# Patient Record
Sex: MALE | Race: WHITE
[De-identification: names, ages, dates, MRNs, and addresses within clinical notes are randomized per-mention and may not be internally consistent; named-entity substitution may affect disease eponyms.]

---

## 2017-05-30 PROBLEM — Z00.00 ENCOUNTER FOR PREVENTIVE HEALTH EXAMINATION: Status: ACTIVE | Noted: 2017-05-30

## 2017-06-01 ENCOUNTER — RECORD ABSTRACTING (OUTPATIENT)
Age: 82
End: 2017-06-01

## 2017-06-06 ENCOUNTER — APPOINTMENT (OUTPATIENT)
Dept: CARDIOLOGY | Facility: CLINIC | Age: 82
End: 2017-06-06

## 2017-06-06 ENCOUNTER — RECORD ABSTRACTING (OUTPATIENT)
Age: 82
End: 2017-06-06

## 2017-06-06 VITALS — BODY MASS INDEX: 25.33 KG/M2 | HEIGHT: 65 IN | WEIGHT: 152 LBS

## 2017-06-06 VITALS — SYSTOLIC BLOOD PRESSURE: 144 MMHG | HEART RATE: 64 BPM | DIASTOLIC BLOOD PRESSURE: 80 MMHG

## 2017-06-06 VITALS — SYSTOLIC BLOOD PRESSURE: 160 MMHG | DIASTOLIC BLOOD PRESSURE: 84 MMHG

## 2017-06-06 DIAGNOSIS — Z86.39 PERSONAL HISTORY OF OTHER ENDOCRINE, NUTRITIONAL AND METABOLIC DISEASE: ICD-10-CM

## 2017-06-06 DIAGNOSIS — Z98.890 OTHER SPECIFIED POSTPROCEDURAL STATES: ICD-10-CM

## 2017-06-06 DIAGNOSIS — Z95.9 PRESENCE OF CARDIAC AND VASCULAR IMPLANT AND GRAFT, UNSPECIFIED: ICD-10-CM

## 2017-06-06 DIAGNOSIS — Z78.9 OTHER SPECIFIED HEALTH STATUS: ICD-10-CM

## 2017-06-06 DIAGNOSIS — Z87.891 PERSONAL HISTORY OF NICOTINE DEPENDENCE: ICD-10-CM

## 2017-06-06 DIAGNOSIS — K40.90 UNILATERAL INGUINAL HERNIA, W/OUT OBSTRUCTION OR GANGRENE, NOT SPECIFIED AS RECURRENT: ICD-10-CM

## 2017-06-06 DIAGNOSIS — K21.9 GASTRO-ESOPHAGEAL REFLUX DISEASE W/OUT ESOPHAGITIS: ICD-10-CM

## 2017-06-06 DIAGNOSIS — K21.0 GASTRO-ESOPHAGEAL REFLUX DISEASE WITH ESOPHAGITIS: ICD-10-CM

## 2017-06-06 RX ORDER — OMEPRAZOLE 40 MG/1
40 CAPSULE, DELAYED RELEASE ORAL
Refills: 0 | Status: ACTIVE | COMMUNITY

## 2017-06-06 RX ORDER — CLOPIDOGREL BISULFATE 75 MG/1
75 TABLET, FILM COATED ORAL DAILY
Refills: 0 | Status: ACTIVE | COMMUNITY

## 2017-06-06 RX ORDER — ASPIRIN 81 MG
81 TABLET, DELAYED RELEASE (ENTERIC COATED) ORAL DAILY
Refills: 0 | Status: ACTIVE | COMMUNITY

## 2017-06-06 RX ORDER — FOLIC ACID 1 MG/1
1 TABLET ORAL DAILY
Refills: 0 | Status: ACTIVE | COMMUNITY

## 2017-06-06 RX ORDER — METOPROLOL TARTRATE 25 MG/1
25 TABLET, FILM COATED ORAL
Refills: 0 | Status: ACTIVE | COMMUNITY

## 2017-06-06 RX ORDER — ATORVASTATIN CALCIUM 10 MG/1
10 TABLET, FILM COATED ORAL
Refills: 0 | Status: ACTIVE | COMMUNITY

## 2017-06-06 RX ORDER — CLONAZEPAM 1 MG/1
1 TABLET ORAL DAILY
Refills: 0 | Status: ACTIVE | COMMUNITY

## 2017-06-19 ENCOUNTER — OUTPATIENT (OUTPATIENT)
Dept: OUTPATIENT SERVICES | Facility: HOSPITAL | Age: 82
LOS: 1 days | Discharge: HOME | End: 2017-06-19

## 2017-06-19 DIAGNOSIS — R07.89 OTHER CHEST PAIN: ICD-10-CM

## 2017-06-28 DIAGNOSIS — I10 ESSENTIAL (PRIMARY) HYPERTENSION: ICD-10-CM

## 2017-06-28 DIAGNOSIS — I25.118 ATHEROSCLEROTIC HEART DISEASE OF NATIVE CORONARY ARTERY WITH OTHER FORMS OF ANGINA PECTORIS: ICD-10-CM

## 2017-06-28 DIAGNOSIS — Z87.891 PERSONAL HISTORY OF NICOTINE DEPENDENCE: ICD-10-CM

## 2017-06-28 DIAGNOSIS — Z98.61 CORONARY ANGIOPLASTY STATUS: ICD-10-CM

## 2017-06-28 DIAGNOSIS — J44.9 CHRONIC OBSTRUCTIVE PULMONARY DISEASE, UNSPECIFIED: ICD-10-CM

## 2017-06-28 DIAGNOSIS — R94.39 ABNORMAL RESULT OF OTHER CARDIOVASCULAR FUNCTION STUDY: ICD-10-CM

## 2017-06-30 ENCOUNTER — OUTPATIENT (OUTPATIENT)
Dept: OUTPATIENT SERVICES | Facility: HOSPITAL | Age: 82
LOS: 1 days | Discharge: HOME | End: 2017-06-30

## 2017-06-30 DIAGNOSIS — D50.8 OTHER IRON DEFICIENCY ANEMIAS: ICD-10-CM

## 2017-06-30 DIAGNOSIS — R07.89 OTHER CHEST PAIN: ICD-10-CM

## 2017-06-30 DIAGNOSIS — E11.9 TYPE 2 DIABETES MELLITUS WITHOUT COMPLICATIONS: ICD-10-CM

## 2017-06-30 DIAGNOSIS — E78.2 MIXED HYPERLIPIDEMIA: ICD-10-CM

## 2017-06-30 DIAGNOSIS — I50.9 HEART FAILURE, UNSPECIFIED: ICD-10-CM

## 2017-06-30 DIAGNOSIS — D53.9 NUTRITIONAL ANEMIA, UNSPECIFIED: ICD-10-CM

## 2017-07-11 ENCOUNTER — APPOINTMENT (OUTPATIENT)
Dept: CARDIOLOGY | Facility: CLINIC | Age: 82
End: 2017-07-11

## 2017-07-11 VITALS — HEART RATE: 67 BPM | SYSTOLIC BLOOD PRESSURE: 140 MMHG | DIASTOLIC BLOOD PRESSURE: 80 MMHG

## 2017-07-11 DIAGNOSIS — I34.0 NONRHEUMATIC MITRAL (VALVE) INSUFFICIENCY: ICD-10-CM

## 2017-07-11 DIAGNOSIS — I25.2 OLD MYOCARDIAL INFARCTION: ICD-10-CM

## 2017-07-11 DIAGNOSIS — I25.10 ATHEROSCLEROTIC HEART DISEASE OF NATIVE CORONARY ARTERY W/OUT ANGINA PECTORIS: ICD-10-CM

## 2017-07-11 DIAGNOSIS — J44.9 CHRONIC OBSTRUCTIVE PULMONARY DISEASE, UNSPECIFIED: ICD-10-CM

## 2017-07-11 DIAGNOSIS — I07.1 RHEUMATIC TRICUSPID INSUFFICIENCY: ICD-10-CM

## 2017-07-11 DIAGNOSIS — I35.1 NONRHEUMATIC AORTIC (VALVE) INSUFFICIENCY: ICD-10-CM

## 2017-07-11 DIAGNOSIS — E78.5 HYPERLIPIDEMIA, UNSPECIFIED: ICD-10-CM

## 2017-10-23 ENCOUNTER — OUTPATIENT (OUTPATIENT)
Dept: OUTPATIENT SERVICES | Facility: HOSPITAL | Age: 82
LOS: 1 days | Discharge: HOME | End: 2017-10-23

## 2017-10-23 DIAGNOSIS — N39.0 URINARY TRACT INFECTION, SITE NOT SPECIFIED: ICD-10-CM

## 2017-10-23 DIAGNOSIS — D50.8 OTHER IRON DEFICIENCY ANEMIAS: ICD-10-CM

## 2017-10-23 DIAGNOSIS — R07.89 OTHER CHEST PAIN: ICD-10-CM

## 2017-10-23 DIAGNOSIS — E11.9 TYPE 2 DIABETES MELLITUS WITHOUT COMPLICATIONS: ICD-10-CM

## 2017-10-23 DIAGNOSIS — R53.82 CHRONIC FATIGUE, UNSPECIFIED: ICD-10-CM

## 2017-10-23 DIAGNOSIS — E87.2 ACIDOSIS: ICD-10-CM

## 2017-10-23 DIAGNOSIS — E78.2 MIXED HYPERLIPIDEMIA: ICD-10-CM

## 2017-10-23 DIAGNOSIS — D53.9 NUTRITIONAL ANEMIA, UNSPECIFIED: ICD-10-CM

## 2017-11-01 DIAGNOSIS — R06.89 OTHER ABNORMALITIES OF BREATHING: ICD-10-CM

## 2017-12-20 ENCOUNTER — OUTPATIENT (OUTPATIENT)
Dept: OUTPATIENT SERVICES | Facility: HOSPITAL | Age: 82
LOS: 1 days | Discharge: HOME | End: 2017-12-20

## 2017-12-20 DIAGNOSIS — D53.9 NUTRITIONAL ANEMIA, UNSPECIFIED: ICD-10-CM

## 2017-12-20 DIAGNOSIS — R07.89 OTHER CHEST PAIN: ICD-10-CM

## 2018-02-27 ENCOUNTER — OUTPATIENT (OUTPATIENT)
Dept: OUTPATIENT SERVICES | Facility: HOSPITAL | Age: 83
LOS: 1 days | Discharge: HOME | End: 2018-02-27

## 2018-02-27 DIAGNOSIS — D53.9 NUTRITIONAL ANEMIA, UNSPECIFIED: ICD-10-CM

## 2018-05-23 ENCOUNTER — OUTPATIENT (OUTPATIENT)
Dept: OUTPATIENT SERVICES | Facility: HOSPITAL | Age: 83
LOS: 1 days | Discharge: HOME | End: 2018-05-23

## 2018-05-23 DIAGNOSIS — E11.9 TYPE 2 DIABETES MELLITUS WITHOUT COMPLICATIONS: ICD-10-CM

## 2018-05-23 DIAGNOSIS — N39.0 URINARY TRACT INFECTION, SITE NOT SPECIFIED: ICD-10-CM

## 2018-05-23 DIAGNOSIS — D50.8 OTHER IRON DEFICIENCY ANEMIAS: ICD-10-CM

## 2018-05-23 DIAGNOSIS — D53.9 NUTRITIONAL ANEMIA, UNSPECIFIED: ICD-10-CM

## 2018-05-23 DIAGNOSIS — E87.2 ACIDOSIS: ICD-10-CM

## 2018-07-23 ENCOUNTER — OUTPATIENT (OUTPATIENT)
Dept: OUTPATIENT SERVICES | Facility: HOSPITAL | Age: 83
LOS: 1 days | Discharge: HOME | End: 2018-07-23

## 2018-07-23 DIAGNOSIS — D53.9 NUTRITIONAL ANEMIA, UNSPECIFIED: ICD-10-CM

## 2018-07-23 DIAGNOSIS — E87.2 ACIDOSIS: ICD-10-CM

## 2018-07-23 DIAGNOSIS — E78.2 MIXED HYPERLIPIDEMIA: ICD-10-CM

## 2018-09-03 PROBLEM — I07.1 MILD TRICUSPID REGURGITATION: Status: ACTIVE | Noted: 2017-06-06

## 2018-11-26 ENCOUNTER — OUTPATIENT (OUTPATIENT)
Dept: OUTPATIENT SERVICES | Facility: HOSPITAL | Age: 83
LOS: 1 days | Discharge: HOME | End: 2018-11-26

## 2018-11-26 DIAGNOSIS — M81.0 AGE-RELATED OSTEOPOROSIS WITHOUT CURRENT PATHOLOGICAL FRACTURE: ICD-10-CM

## 2019-03-15 ENCOUNTER — OUTPATIENT (OUTPATIENT)
Dept: OUTPATIENT SERVICES | Facility: HOSPITAL | Age: 84
LOS: 1 days | Discharge: HOME | End: 2019-03-15

## 2019-03-15 DIAGNOSIS — E78.2 MIXED HYPERLIPIDEMIA: ICD-10-CM

## 2019-03-15 DIAGNOSIS — E11.9 TYPE 2 DIABETES MELLITUS WITHOUT COMPLICATIONS: ICD-10-CM

## 2019-03-15 DIAGNOSIS — E87.2 ACIDOSIS: ICD-10-CM

## 2019-03-15 DIAGNOSIS — D53.9 NUTRITIONAL ANEMIA, UNSPECIFIED: ICD-10-CM

## 2019-03-15 DIAGNOSIS — D50.8 OTHER IRON DEFICIENCY ANEMIAS: ICD-10-CM

## 2019-07-12 ENCOUNTER — OUTPATIENT (OUTPATIENT)
Dept: OUTPATIENT SERVICES | Facility: HOSPITAL | Age: 84
LOS: 1 days | Discharge: HOME | End: 2019-07-12

## 2019-07-12 DIAGNOSIS — R53.82 CHRONIC FATIGUE, UNSPECIFIED: ICD-10-CM

## 2019-07-12 DIAGNOSIS — N40.0 BENIGN PROSTATIC HYPERPLASIA WITHOUT LOWER URINARY TRACT SYMPTOMS: ICD-10-CM

## 2019-07-12 DIAGNOSIS — E11.9 TYPE 2 DIABETES MELLITUS WITHOUT COMPLICATIONS: ICD-10-CM

## 2019-07-12 DIAGNOSIS — E78.2 MIXED HYPERLIPIDEMIA: ICD-10-CM

## 2019-07-12 DIAGNOSIS — D53.9 NUTRITIONAL ANEMIA, UNSPECIFIED: ICD-10-CM

## 2019-07-12 DIAGNOSIS — D50.8 OTHER IRON DEFICIENCY ANEMIAS: ICD-10-CM

## 2019-07-12 DIAGNOSIS — E87.2 ACIDOSIS: ICD-10-CM

## 2019-07-17 ENCOUNTER — EMERGENCY (EMERGENCY)
Facility: HOSPITAL | Age: 84
LOS: 0 days | Discharge: HOME | End: 2019-07-17
Attending: EMERGENCY MEDICINE | Admitting: EMERGENCY MEDICINE
Payer: MEDICARE

## 2019-07-17 VITALS
RESPIRATION RATE: 19 BRPM | OXYGEN SATURATION: 97 % | DIASTOLIC BLOOD PRESSURE: 70 MMHG | SYSTOLIC BLOOD PRESSURE: 160 MMHG | HEART RATE: 69 BPM | TEMPERATURE: 97 F

## 2019-07-17 DIAGNOSIS — Z00.00 ENCOUNTER FOR GENERAL ADULT MEDICAL EXAMINATION WITHOUT ABNORMAL FINDINGS: ICD-10-CM

## 2019-07-17 DIAGNOSIS — R07.9 CHEST PAIN, UNSPECIFIED: ICD-10-CM

## 2019-07-17 LAB
ALBUMIN SERPL ELPH-MCNC: 4.1 G/DL — SIGNIFICANT CHANGE UP (ref 3.5–5.2)
ALP SERPL-CCNC: 81 U/L — SIGNIFICANT CHANGE UP (ref 30–115)
ALT FLD-CCNC: 10 U/L — SIGNIFICANT CHANGE UP (ref 0–41)
ANION GAP SERPL CALC-SCNC: 11 MMOL/L — SIGNIFICANT CHANGE UP (ref 7–14)
AST SERPL-CCNC: 13 U/L — SIGNIFICANT CHANGE UP (ref 0–41)
BILIRUB SERPL-MCNC: 1.3 MG/DL — HIGH (ref 0.2–1.2)
BUN SERPL-MCNC: 14 MG/DL — SIGNIFICANT CHANGE UP (ref 10–20)
CALCIUM SERPL-MCNC: 9.3 MG/DL — SIGNIFICANT CHANGE UP (ref 8.5–10.1)
CHLORIDE SERPL-SCNC: 103 MMOL/L — SIGNIFICANT CHANGE UP (ref 98–110)
CO2 SERPL-SCNC: 27 MMOL/L — SIGNIFICANT CHANGE UP (ref 17–32)
CREAT SERPL-MCNC: 1 MG/DL — SIGNIFICANT CHANGE UP (ref 0.7–1.5)
GLUCOSE SERPL-MCNC: 110 MG/DL — HIGH (ref 70–99)
HCT VFR BLD CALC: 39 % — LOW (ref 42–52)
HGB BLD-MCNC: 12.8 G/DL — LOW (ref 14–18)
LACTATE SERPL-SCNC: 0.8 MMOL/L — SIGNIFICANT CHANGE UP (ref 0.5–2.2)
LIDOCAIN IGE QN: 19 U/L — SIGNIFICANT CHANGE UP (ref 7–60)
MCHC RBC-ENTMCNC: 31.9 PG — HIGH (ref 27–31)
MCHC RBC-ENTMCNC: 32.8 G/DL — SIGNIFICANT CHANGE UP (ref 32–37)
MCV RBC AUTO: 97.3 FL — HIGH (ref 80–94)
NRBC # BLD: 0 /100 WBCS — SIGNIFICANT CHANGE UP (ref 0–0)
PLATELET # BLD AUTO: 109 K/UL — LOW (ref 130–400)
POTASSIUM SERPL-MCNC: 4.4 MMOL/L — SIGNIFICANT CHANGE UP (ref 3.5–5)
POTASSIUM SERPL-SCNC: 4.4 MMOL/L — SIGNIFICANT CHANGE UP (ref 3.5–5)
PROT SERPL-MCNC: 7.2 G/DL — SIGNIFICANT CHANGE UP (ref 6–8)
RBC # BLD: 4.01 M/UL — LOW (ref 4.7–6.1)
RBC # FLD: 12.8 % — SIGNIFICANT CHANGE UP (ref 11.5–14.5)
SODIUM SERPL-SCNC: 141 MMOL/L — SIGNIFICANT CHANGE UP (ref 135–146)
TROPONIN T SERPL-MCNC: <0.01 NG/ML — SIGNIFICANT CHANGE UP
WBC # BLD: 5.94 K/UL — SIGNIFICANT CHANGE UP (ref 4.8–10.8)
WBC # FLD AUTO: 5.94 K/UL — SIGNIFICANT CHANGE UP (ref 4.8–10.8)

## 2019-07-17 PROCEDURE — 99285 EMERGENCY DEPT VISIT HI MDM: CPT

## 2019-07-17 PROCEDURE — 93010 ELECTROCARDIOGRAM REPORT: CPT

## 2019-07-17 PROCEDURE — 71046 X-RAY EXAM CHEST 2 VIEWS: CPT | Mod: 26

## 2019-07-17 NOTE — ED PROVIDER NOTE - CARE PROVIDER_API CALL
Michele Henley)  Cardiovascular Disease; Internal Medicine  [address] [place] [address]  Phone: [phone]  Fax: [phone]  Follow Up Time:

## 2019-07-17 NOTE — ED PROVIDER NOTE - OBJECTIVE STATEMENT
Patient is a 89 year old male with PMHX CAD with stent on plavix presents to ED for eval. States that he was sitting down watching TV when he began to experience a "funny sensation" that began in abdomen, went up to chest/arms/chin and head. States that he felt warm. Symptoms lasted for about 2-3 minutes and resolved with taking nitro. Patient also took 81 mg ASA. States that this is the second time recently that he has these symptoms and wanted to come get checked out. Denies fever/chills, abdominal pain, dizziness, diaphoresis, nausea/vomiting

## 2019-07-17 NOTE — ED PROVIDER NOTE - PROGRESS NOTE DETAILS
ED work up wnl. symptoms do not sound like ACS. Asymptomatic since coming to ED. advised to follow up with Dr Henley. will d/c home

## 2019-07-17 NOTE — ED PROVIDER NOTE - NS ED ROS FT
Constitutional: See HPI. No fever/chills.  Eyes: No visual changes, eye pain or discharge.  ENT: No hearing changes, pain, discharge or infections.  Neck: No neck pain or stiffness.  Cardiac: funny sensation in chest   Respiratory: No cough or respiratory distress. No hemoptysis.   GI: funny sensation in abdomen  : No dysuria, frequency or burning.   MS: No myalgia, muscle weakness, joint pain or back pain.  Neuro: No headache or weakness. No LOC.  Skin: No rash.  Except as documented in the HPI, all other systems are negative.

## 2019-07-17 NOTE — ED PROVIDER NOTE - PHYSICAL EXAMINATION
VITAL SIGNS: I have reviewed nursing notes and confirm.  CONSTITUTIONAL: Well-developed; well-nourished; in no acute distress.  SKIN: Skin exam is warm and dry, no acute rash.  HEAD: Normocephalic; atraumatic.  NECK: Supple; non tender.  CARD: S1, S2 normal; no murmurs, gallops, or rubs. Regular rate and rhythm.  RESP: No wheezes, rales or rhonchi.  ABD: Normal bowel sounds; soft; non-distended; non-tender  EXT: Normal ROM. No clubbing, cyanosis or edema.  LYMPH: No acute cervical adenopathy.  NEURO: Alert, oriented. Grossly unremarkable. No focal deficits.  PSYCH: Cooperative, appropriate.

## 2019-07-17 NOTE — ED PROVIDER NOTE - ATTENDING CONTRIBUTION TO CARE
I personally evaluated the patient. I reviewed the Resident’s or Physician Assistant’s note (as assigned above), and agree with the findings and plan except as documented in my note.     89yo M with PMHx CAD/stents comes to ED for eval of feeling a "funny sensation" in his abdomen that went up into his chest, arms, and neck that occurred while he was watching TV. Sensation is not described as pain, pressure, or tingling. Lasted for 2-3 minutes before resolving spontaneously. Pt took nitro and baby aspirin after the symptoms resolved "just in case." Had a similar sensation yesterday that also resolved spontaneously. Asymptomatic in ED. Denies fever, headache, lightheadedness, CP, SOB, cough, palpitations, nausea, vomiting, diarrhea, abd pain, dysuria, leg swelling, diaphoresis.    Exam: VS reviewed. Patient well appearing, NAD. NCAT. Anicteric. MMM. Supple, non tender. Normal respiratory effort, CTA B/L, no rhonchi, rales, crackles. RRR. No murmurs. Abdomen soft, NDNT, no guarding or rebound. Brisk cap refill. 2+ radial pulses. No leg edema or calf tenderness. Skin warm and dry. AAOx3. No gross FND.     labs, ekg, cxr

## 2019-07-17 NOTE — ED PROVIDER NOTE - CLINICAL SUMMARY MEDICAL DECISION MAKING FREE TEXT BOX
89yo M with nonspecific complaint of a transient "funny sensation" originating in abdomen and moving up into chest, arms, neck and head while watching TV, spontaneously resolved without after 2-3 minutes. Sensation not described as pain, pressure, nausea. Asymptomatic and well appearing throughout ED stay. Workup unrevealing. Pt to follow up with PMD and cardiologist.  Patient to be discharged from ED. Any available test results were discussed with patient and/or family. Verbal instructions given, including instructions to return to ED immediately for any new, worsening, or concerning symptoms. Patient endorsed understanding. Written discharge instructions additionally given, including follow-up plan.

## 2019-08-02 ENCOUNTER — OUTPATIENT (OUTPATIENT)
Dept: OUTPATIENT SERVICES | Facility: HOSPITAL | Age: 84
LOS: 1 days | Discharge: HOME | End: 2019-08-02

## 2019-08-02 DIAGNOSIS — D50.8 OTHER IRON DEFICIENCY ANEMIAS: ICD-10-CM

## 2019-08-02 DIAGNOSIS — E87.2 ACIDOSIS: ICD-10-CM

## 2019-08-02 DIAGNOSIS — K62.5 HEMORRHAGE OF ANUS AND RECTUM: ICD-10-CM

## 2019-10-10 ENCOUNTER — OUTPATIENT (OUTPATIENT)
Dept: OUTPATIENT SERVICES | Facility: HOSPITAL | Age: 84
LOS: 1 days | Discharge: HOME | End: 2019-10-10

## 2019-10-10 DIAGNOSIS — K62.5 HEMORRHAGE OF ANUS AND RECTUM: ICD-10-CM

## 2019-10-10 DIAGNOSIS — N39.0 URINARY TRACT INFECTION, SITE NOT SPECIFIED: ICD-10-CM

## 2019-11-18 ENCOUNTER — EMERGENCY (EMERGENCY)
Facility: HOSPITAL | Age: 84
LOS: 0 days | Discharge: HOME | End: 2019-11-19
Attending: EMERGENCY MEDICINE | Admitting: EMERGENCY MEDICINE
Payer: MEDICARE

## 2019-11-18 VITALS
RESPIRATION RATE: 18 BRPM | SYSTOLIC BLOOD PRESSURE: 149 MMHG | HEART RATE: 97 BPM | DIASTOLIC BLOOD PRESSURE: 66 MMHG | OXYGEN SATURATION: 97 % | TEMPERATURE: 96 F

## 2019-11-18 PROCEDURE — 99285 EMERGENCY DEPT VISIT HI MDM: CPT | Mod: GC

## 2019-11-19 VITALS
TEMPERATURE: 97 F | SYSTOLIC BLOOD PRESSURE: 140 MMHG | RESPIRATION RATE: 18 BRPM | DIASTOLIC BLOOD PRESSURE: 71 MMHG | OXYGEN SATURATION: 97 % | HEART RATE: 55 BPM

## 2019-11-19 LAB
ALBUMIN SERPL ELPH-MCNC: 3.7 G/DL — SIGNIFICANT CHANGE UP (ref 3.5–5.2)
ALP SERPL-CCNC: 50 U/L — SIGNIFICANT CHANGE UP (ref 30–115)
ALT FLD-CCNC: 19 U/L — SIGNIFICANT CHANGE UP (ref 0–41)
ANION GAP SERPL CALC-SCNC: 12 MMOL/L — SIGNIFICANT CHANGE UP (ref 7–14)
APPEARANCE UR: CLEAR — SIGNIFICANT CHANGE UP
AST SERPL-CCNC: 15 U/L — SIGNIFICANT CHANGE UP (ref 0–41)
BASOPHILS # BLD AUTO: 0.01 K/UL — SIGNIFICANT CHANGE UP (ref 0–0.2)
BASOPHILS NFR BLD AUTO: 0.2 % — SIGNIFICANT CHANGE UP (ref 0–1)
BILIRUB SERPL-MCNC: 1.4 MG/DL — HIGH (ref 0.2–1.2)
BILIRUB UR-MCNC: NEGATIVE — SIGNIFICANT CHANGE UP
BUN SERPL-MCNC: 25 MG/DL — HIGH (ref 10–20)
CALCIUM SERPL-MCNC: 8.5 MG/DL — SIGNIFICANT CHANGE UP (ref 8.5–10.1)
CHLORIDE SERPL-SCNC: 102 MMOL/L — SIGNIFICANT CHANGE UP (ref 98–110)
CK SERPL-CCNC: 34 U/L — SIGNIFICANT CHANGE UP (ref 0–225)
CO2 SERPL-SCNC: 23 MMOL/L — SIGNIFICANT CHANGE UP (ref 17–32)
COLOR SPEC: SIGNIFICANT CHANGE UP
CREAT SERPL-MCNC: 1.1 MG/DL — SIGNIFICANT CHANGE UP (ref 0.7–1.5)
DIFF PNL FLD: NEGATIVE — SIGNIFICANT CHANGE UP
EOSINOPHIL # BLD AUTO: 0 K/UL — SIGNIFICANT CHANGE UP (ref 0–0.7)
EOSINOPHIL NFR BLD AUTO: 0 % — SIGNIFICANT CHANGE UP (ref 0–8)
GLUCOSE SERPL-MCNC: 129 MG/DL — HIGH (ref 70–99)
GLUCOSE UR QL: NEGATIVE — SIGNIFICANT CHANGE UP
HCT VFR BLD CALC: 34.9 % — LOW (ref 42–52)
HGB BLD-MCNC: 11.5 G/DL — LOW (ref 14–18)
IMM GRANULOCYTES NFR BLD AUTO: 0.4 % — HIGH (ref 0.1–0.3)
KETONES UR-MCNC: NEGATIVE — SIGNIFICANT CHANGE UP
LEUKOCYTE ESTERASE UR-ACNC: NEGATIVE — SIGNIFICANT CHANGE UP
LIDOCAIN IGE QN: 13 U/L — SIGNIFICANT CHANGE UP (ref 7–60)
LYMPHOCYTES # BLD AUTO: 0.88 K/UL — LOW (ref 1.2–3.4)
LYMPHOCYTES # BLD AUTO: 17.1 % — LOW (ref 20.5–51.1)
MCHC RBC-ENTMCNC: 31.9 PG — HIGH (ref 27–31)
MCHC RBC-ENTMCNC: 33 G/DL — SIGNIFICANT CHANGE UP (ref 32–37)
MCV RBC AUTO: 96.9 FL — HIGH (ref 80–94)
MONOCYTES # BLD AUTO: 0.27 K/UL — SIGNIFICANT CHANGE UP (ref 0.1–0.6)
MONOCYTES NFR BLD AUTO: 5.2 % — SIGNIFICANT CHANGE UP (ref 1.7–9.3)
NEUTROPHILS # BLD AUTO: 3.97 K/UL — SIGNIFICANT CHANGE UP (ref 1.4–6.5)
NEUTROPHILS NFR BLD AUTO: 77.1 % — HIGH (ref 42.2–75.2)
NITRITE UR-MCNC: NEGATIVE — SIGNIFICANT CHANGE UP
NRBC # BLD: 0 /100 WBCS — SIGNIFICANT CHANGE UP (ref 0–0)
PH UR: 6.5 — SIGNIFICANT CHANGE UP (ref 5–8)
PLATELET # BLD AUTO: 96 K/UL — LOW (ref 130–400)
POTASSIUM SERPL-MCNC: 4.6 MMOL/L — SIGNIFICANT CHANGE UP (ref 3.5–5)
POTASSIUM SERPL-SCNC: 4.6 MMOL/L — SIGNIFICANT CHANGE UP (ref 3.5–5)
PROT SERPL-MCNC: 5.9 G/DL — LOW (ref 6–8)
PROT UR-MCNC: NEGATIVE — SIGNIFICANT CHANGE UP
RBC # BLD: 3.6 M/UL — LOW (ref 4.7–6.1)
RBC # FLD: 13.8 % — SIGNIFICANT CHANGE UP (ref 11.5–14.5)
SODIUM SERPL-SCNC: 137 MMOL/L — SIGNIFICANT CHANGE UP (ref 135–146)
SP GR SPEC: 1.01 — SIGNIFICANT CHANGE UP (ref 1.01–1.02)
TROPONIN T SERPL-MCNC: <0.01 NG/ML — SIGNIFICANT CHANGE UP
UROBILINOGEN FLD QL: SIGNIFICANT CHANGE UP
WBC # BLD: 5.15 K/UL — SIGNIFICANT CHANGE UP (ref 4.8–10.8)
WBC # FLD AUTO: 5.15 K/UL — SIGNIFICANT CHANGE UP (ref 4.8–10.8)

## 2019-11-19 PROCEDURE — 71045 X-RAY EXAM CHEST 1 VIEW: CPT | Mod: 26

## 2019-11-19 PROCEDURE — 70450 CT HEAD/BRAIN W/O DYE: CPT | Mod: 26

## 2019-11-19 PROCEDURE — 93010 ELECTROCARDIOGRAM REPORT: CPT

## 2019-11-19 PROCEDURE — 99218: CPT

## 2019-11-19 NOTE — ED CDU PROVIDER DISPOSITION NOTE - ATTENDING CONTRIBUTION TO CARE
I personally evaluated the patient. I reviewed the Resident’s or Physician Assistant’s note (as assigned above), and agree with the findings and plan except as documented in my note.      89 male here for poorly qualified episode of semi-responsiveness of which he has limited memory of the event, had ED workup including screening imaging and labs, plan was EDOU management pending recontact with family.

## 2019-11-19 NOTE — ED ADULT NURSE NOTE - INTERVENTIONS DEFINITIONS
Instruct patient to call for assistance/Monitor for mental status changes and reorient to person, place, and time/[place] to call system/Physically safe environment: no spills, clutter or unnecessary equipment/Reinforce activity limits and safety measures with patient and family/Review medications for side effects contributing to fall risk/Stretcher in lowest position, wheels locked, appropriate side rails in place

## 2019-11-19 NOTE — ED ADULT NURSE REASSESSMENT NOTE - NS ED NURSE REASSESS COMMENT FT1
spoke with nash Jones who is health care proxy, as per Karen she will be sending a family member to pick patient up. As per MD Elizabeth SAWYER MD patient will be dispo home

## 2019-11-19 NOTE — ED CDU PROVIDER INITIAL DAY NOTE - NEURO NEGATIVE STATEMENT, MLM
no loss of consciousness, no gait abnormality, no headache, no sensory deficits, and no weakness. +?ams

## 2019-11-19 NOTE — ED PROVIDER NOTE - ATTENDING CONTRIBUTION TO CARE
89 yo male with PMH CAD, HTN, HLD, BPH presented for evaluation with weakness. Pt per girlfriend stated she had trouble awakening him and he was not ambulating as well at baseline. Pt taking benzos, unclear if medication related or other reason. Pt AAO and without complaints in ED. No reported falls or trauma. Denied any HA, dizziness, N/V, CP, SOB, palpitations or abdominal pain.     VITAL SIGNS: noted  CONSTITUTIONAL: Well-developed; well-nourished; in no acute distress  HEAD: Normocephalic; atraumatic  EYES: PERRL, EOM intact; conjunctiva and sclera clear  ENT: No nasal discharge; airway clear. MMM  NECK: Supple; non tender. No anterior cervical lymphadenopathy noted  CARD: S1, S2 normal; no murmurs, gallops, or rubs. Bradycardic  RESP: CTAB/L, no wheezes, rales or rhonchi  ABD: Normal bowel sounds; soft; non-distended; non-tender; no CVA tenderness  EXT: Normal ROM. No calf tenderness or edema. Distal pulses intact  NEURO: Alert, oriented. Grossly unremarkable. No focal deficits  SKIN: Skin exam is warm and dry

## 2019-11-19 NOTE — ED CDU PROVIDER INITIAL DAY NOTE - NS ED ATTENDING STATEMENT MOD
37.2 I have personally performed a face to face diagnostic evaluation on this patient. I have reviewed the ACP note and agree with the history, exam and plan of care, except as noted.

## 2019-11-19 NOTE — ED ADULT NURSE REASSESSMENT NOTE - NS ED NURSE REASSESS COMMENT FT1
Patient able to ambulate independently without any dizziness. As per MD Wray will prepare discharge. IV line removed. Patient able to ambulate independently without any dizziness. As per MD Wray will prepare discharge. IV line removed. Nephew here to transport patient back home. Awaiting MD Johnson to speak to patient and nephew regarding discharge

## 2019-11-19 NOTE — ED ADULT NURSE REASSESSMENT NOTE - NS ED NURSE REASSESS COMMENT FT1
Pt resting calmly and comfortably able to be aroused, remains A&Ox3. Pt to be admitted to observation and pending CT head. Will continue to monitor.

## 2019-11-19 NOTE — ED PROVIDER NOTE - OBJECTIVE STATEMENT
89 y m pmh bph, cad, hld, htn pw ams and weakness. Per EMS, patient's girlfriend noted that he seemed less energetic than normal and was having difficulty walking around his house. Patient states he has no symptoms and is unsure why he is in the ED. Denies headache, n/v, cp, sob, abd pain, back pain, urinary sx, fall.

## 2019-11-19 NOTE — ED CDU PROVIDER INITIAL DAY NOTE - MEDICAL DECISION MAKING DETAILS
89 male here for poorly qualified episode of semi-responsiveness of which he has limited memory of the event, had ED workup including screening imaging and labs, plan was EDOU management pending recontact with family.   In EDOU he had no acute events. Labs and imaging reviewed. Physical exam is grossly unremarkable, well male in no distress. Appears anxious and relates several social issues at present for him; his niece Karen cohabitates with him normally in his home but she is recuperating from surgery and while is staying with a ladyfriend during this. The ladyfriend lost her  recently. He has a friend, Bernard, who I spoke with at his request, who helps coordinate his social care. Pt prefers to be d/c'd, states "I don't know why I am here, they called the ambulance on me".  Have had numerous discussions with family, the ladyfriend Lety's son is coming to pick him up.  Return instructions provided. 89 male here for poorly qualified episode of semi-responsiveness of which he has limited memory of the event, had ED workup including screening imaging and labs, plan was EDOU management pending recontact with family.   In EDOU he had no acute events. Labs and imaging reviewed. Physical exam is grossly unremarkable, well male in no distress. Appears anxious and relates several social issues at present for him; his niece Karen cohabitates with him normally in his home but she is recuperating from surgery and while is staying with a ladyfriend during this. The ladyfriend lost her  recently. He has a friend, Bernard, who I spoke with at his request, who helps coordinate his social care. Pt prefers to be d/c'd, states "I don't know why I am here, they called the ambulance on me".  Have had numerous discussions with family, the ladyfriend Lety's son is coming to pick him up.  Advised to stop taking his metoprolol 25 mg and d/w his PMD, also informed family of the same. Return instructions provided.

## 2019-11-19 NOTE — ED PROVIDER NOTE - CLINICAL SUMMARY MEDICAL DECISION MAKING FREE TEXT BOX
pt placed in EDOU for further observation bradycardia, anticipated D/C within 24 hours, pt asymptomatic in ED

## 2019-11-19 NOTE — ED CDU PROVIDER DISPOSITION NOTE - PATIENT PORTAL LINK FT
You can access the FollowMyHealth Patient Portal offered by [facility] by registering at the following website: [email or web address]. By joining [employer]’s FollowMyHealth portal, you will also be able to view your health information using other applications (apps) compatible with our system.

## 2019-11-19 NOTE — ED CDU PROVIDER DISPOSITION NOTE - CLINICAL COURSE
89 male here for poorly qualified episode of semi-responsiveness of which he has limited memory of the event, had ED workup including screening imaging and labs, plan was EDOU management pending recontact with family.   In EDOU he had no acute events. Labs and imaging reviewed. Physical exam is grossly unremarkable, well male in no distress. Appears anxious and relates several social issues at present for him; his niece Karen cohabitates with him normally in his home but she is recuperating from surgery and while is staying with a ladyfriend during this. The ladyfriend lost her  recently. He has a friend, Bernard, who I spoke with at his request, who helps coordinate his social care. Pt prefers to be d/c'd, states "I don't know why I am here, they called the ambulance on me".  Have had numerous discussions with family, the ladyfriend Lety's son is coming to pick him up.  Advised to stop taking his metoprolol 25 mg and d/w his PMD, also informed family of the same. Return instructions provided.

## 2019-11-19 NOTE — ED PROVIDER NOTE - PHYSICAL EXAMINATION
CONSTITUTIONAL: Well-developed; well-nourished; in no acute distress.   SKIN: warm, dry  HEAD: Normocephalic; atraumatic.  EYES: PERRL, EOMI, normal sclera and conjunctiva   ENT: No nasal discharge; airway clear.  NECK: Supple; non tender.  CARD: S1, S2 normal; no murmurs, gallops, or rubs. Regular rate and rhythm.   RESP: No wheezes, rales or rhonchi.  ABD: soft ntnd  EXT: Normal ROM.  No clubbing, cyanosis or edema.   LYMPH: No acute cervical adenopathy.  NEURO: Alert, oriented, grossly unremarkable. No cranial nerve deficits. Moving all extremities with normal strength/sensation.   PSYCH: Cooperative, appropriate.

## 2019-11-19 NOTE — ED ADULT NURSE REASSESSMENT NOTE - NS ED NURSE REASSESS COMMENT FT1
patient assessed patient alert and oriented x4. no complaints of chest pain or shortness of breath. Cardiac monitoring and pulse oximetry monitoring maintained. No bradycardia noted, WIll continue to reassess and monitor.

## 2019-11-19 NOTE — ED CDU PROVIDER INITIAL DAY NOTE - ATTENDING CONTRIBUTION TO CARE
I personally evaluated the patient. I reviewed the Resident’s or Physician Assistant’s note (as assigned above), and agree with the findings and plan except as documented in my note.     89 male here for poorly qualified episode of semi-responsiveness of which he has limited memory of the event, had ED workup including screening imaging and labs, plan was EDOU management pending recontact with family.     In EDOU he had no acute events. Labs and imaging reviewed. Physical exam is grossly unremarkable, well male in no distress. CV: pulses intact no bradycardia. CHEST: normal work of breathing. NEURO: AAO 3 no focal deficits speech coordination cognition grossly normal, memory deficits of the event but no long term memory issues. hearing impaired PSYCH: mild anxiety, good thought content and process.     Impression: weakness    Plan: screening imaging, labs, reevaluation

## 2019-11-19 NOTE — ED CDU PROVIDER INITIAL DAY NOTE - OBJECTIVE STATEMENT
89y/o male with pmh of cad, bph, htn, hld, pt. is here for ams. pt. was evaluated in ed and he had normal labs, negative ua. ct head no acute process. pt. is bradycardic in 40s. when pt. was interviewed by me he states he is not surer why he is here. pt. is a&o x2. pt. without any complaints at present time.

## 2019-11-21 DIAGNOSIS — R00.1 BRADYCARDIA, UNSPECIFIED: ICD-10-CM

## 2019-11-21 DIAGNOSIS — R41.82 ALTERED MENTAL STATUS, UNSPECIFIED: ICD-10-CM

## 2019-11-21 DIAGNOSIS — I25.10 ATHEROSCLEROTIC HEART DISEASE OF NATIVE CORONARY ARTERY WITHOUT ANGINA PECTORIS: ICD-10-CM

## 2019-11-21 DIAGNOSIS — I10 ESSENTIAL (PRIMARY) HYPERTENSION: ICD-10-CM

## 2019-11-21 DIAGNOSIS — N40.0 BENIGN PROSTATIC HYPERPLASIA WITHOUT LOWER URINARY TRACT SYMPTOMS: ICD-10-CM

## 2019-11-21 DIAGNOSIS — E78.5 HYPERLIPIDEMIA, UNSPECIFIED: ICD-10-CM

## 2021-11-01 NOTE — ED ADULT NURSE NOTE - OBJECTIVE STATEMENT
detailed exam
Daughter reports that the pt friend call for EMS help after noted that the pt has difficulty standing and seem disorientated. Pt unsure of episode and denies chest pain, headache, nausea, vomiting, diarrhea, and cough. A&Ox3, speaking in full sentences.

## 2021-11-10 ENCOUNTER — EMERGENCY (EMERGENCY)
Facility: HOSPITAL | Age: 86
LOS: 0 days | Discharge: HOME | End: 2021-11-10
Attending: EMERGENCY MEDICINE | Admitting: EMERGENCY MEDICINE
Payer: MEDICARE

## 2021-11-10 VITALS
SYSTOLIC BLOOD PRESSURE: 138 MMHG | TEMPERATURE: 97 F | OXYGEN SATURATION: 99 % | RESPIRATION RATE: 18 BRPM | HEART RATE: 72 BPM | DIASTOLIC BLOOD PRESSURE: 63 MMHG

## 2021-11-10 DIAGNOSIS — M54.50 LOW BACK PAIN, UNSPECIFIED: ICD-10-CM

## 2021-11-10 DIAGNOSIS — Z87.891 PERSONAL HISTORY OF NICOTINE DEPENDENCE: ICD-10-CM

## 2021-11-10 DIAGNOSIS — I25.10 ATHEROSCLEROTIC HEART DISEASE OF NATIVE CORONARY ARTERY WITHOUT ANGINA PECTORIS: ICD-10-CM

## 2021-11-10 DIAGNOSIS — I10 ESSENTIAL (PRIMARY) HYPERTENSION: ICD-10-CM

## 2021-11-10 DIAGNOSIS — Z79.02 LONG TERM (CURRENT) USE OF ANTITHROMBOTICS/ANTIPLATELETS: ICD-10-CM

## 2021-11-10 DIAGNOSIS — M51.36 OTHER INTERVERTEBRAL DISC DEGENERATION, LUMBAR REGION: ICD-10-CM

## 2021-11-10 DIAGNOSIS — N40.0 BENIGN PROSTATIC HYPERPLASIA WITHOUT LOWER URINARY TRACT SYMPTOMS: ICD-10-CM

## 2021-11-10 DIAGNOSIS — E78.5 HYPERLIPIDEMIA, UNSPECIFIED: ICD-10-CM

## 2021-11-10 PROCEDURE — 99284 EMERGENCY DEPT VISIT MOD MDM: CPT

## 2021-11-10 RX ORDER — METHOCARBAMOL 500 MG/1
1 TABLET, FILM COATED ORAL
Qty: 28 | Refills: 0
Start: 2021-11-10 | End: 2021-11-16

## 2021-11-10 RX ORDER — KETOROLAC TROMETHAMINE 30 MG/ML
15 SYRINGE (ML) INJECTION ONCE
Refills: 0 | Status: DISCONTINUED | OUTPATIENT
Start: 2021-11-10 | End: 2021-11-10

## 2021-11-10 RX ORDER — LIDOCAINE 4 G/100G
1 CREAM TOPICAL
Qty: 7 | Refills: 0
Start: 2021-11-10 | End: 2021-11-16

## 2021-11-10 RX ORDER — METHOCARBAMOL 500 MG/1
500 TABLET, FILM COATED ORAL ONCE
Refills: 0 | Status: COMPLETED | OUTPATIENT
Start: 2021-11-10 | End: 2021-11-10

## 2021-11-10 RX ADMIN — Medication 15 MILLIGRAM(S): at 11:20

## 2021-11-10 RX ADMIN — METHOCARBAMOL 500 MILLIGRAM(S): 500 TABLET, FILM COATED ORAL at 11:20

## 2021-11-10 NOTE — ED PROVIDER NOTE - PROVIDER TOKENS
PROVIDER:[TOKEN:[[id number]],FOLLOWUP:[1-3 Days]],FREE:[LAST:[[facility]],PHONE:[(   )    -],FAX:[(   )    -],FOLLOWUP:[1-3 Days]]

## 2021-11-10 NOTE — ED PROVIDER NOTE - ATTENDING CONTRIBUTION TO CARE
89 yo M with pmh of htn, hld, cad, bph presents with R buttock pain.  pt says pain radiates to the back of his RLE.  no trauma.  denies back pain.  pain worse on movement.  pt was dx with sciatica in past and f/u with [facility] and had injection, no improvement so stopped going.  exam: nad, ncat, perrl, eomi, mmm, rrr, ctab, abd soft, nt,nd aox3, ttp R SI notch imp: pt with sciatica, symptomatic tx, pt to f/u with pain mgt

## 2021-11-10 NOTE — ED PROVIDER NOTE - OBJECTIVE STATEMENT
89 y.o. male with a PMH of HTN, hyperlipidemia, sciatica, and lumbar DDD presented to the ER c/o worsening Right low back pain radiating down Right leg for the past several days.  Pain worse with movement and prolonged standing.  Tylenol as needed not helping.  Denies fever, chills, abdominal pain, flank pain, chest pain, extremity weakness/paresthesias, bladder/bowel incontinence, saddle numbness, dysuria, hematuria, ataxia.   No other complaints.

## 2021-11-10 NOTE — ED PROVIDER NOTE - NSFOLLOWUPINSTRUCTIONS_ED_ALL_ED_FT
Low Back Sprain    A sprain is a stretch or tear in the bands of tissue that hold bones and joints together (ligaments). Sprains of the lower back (lumbar spine) are a common cause of low back pain. A sprain occurs when ligaments are overextended or stretched beyond their limits. The ligaments can become inflamed, resulting in pain and sudden muscle tightening (spasms). A sprain can be caused by an injury (trauma), or it can develop gradually due to overuse.  There are three types of sprains:  Grade 1 is a mild sprain involving an overstretched ligament or a very slight tear of the ligament.Grade 2 is a moderate sprain involving a partial tear of the ligament.Grade 3 is a severe sprain involving a complete tear of the ligament.What are the causes?  This condition may be caused by:  Trauma, such as a fall or a hit to the body.Twisting or overstretching the back. This may result from doing activities that require a lot of energy, such as lifting heavy objects.What increases the risk?  The following factors may increase your risk of getting this condition:  Playing contact sports.Participating in sports or activities that put excessive stress on the back and require a lot of bending and twisting, including:  Lifting weights or heavy objects.Gymnastics.Soccer.Figure skating.Snowboarding.Being overweight or obese.Having poor strength and flexibility.What are the signs or symptoms?  Symptoms of this condition may include:  Sharp or dull pain in the lower back that does not go away. Pain may extend to the buttocks.Stiffness.Limited range of motion.Inability to stand up straight due to stiffness or pain.Muscle spasms.How is this diagnosed?  ImageThis condition may be diagnosed based on:  Your symptoms.Your medical history.A physical exam.  Your health care provider may push on certain areas of your back to determine the source of your pain.You may be asked to bend forward, backward, and side to side to assess the severity of your pain and your range of motion.Imaging tests, such as:  X-rays.MRI.How is this treated?  Treatment for this condition may include:  Applying heat and cold to the affected area.Medicines to help relieve pain and to relax your muscles (muscle relaxants).NSAIDs to help reduce swelling and discomfort.Physical therapy.When your symptoms improve, it is important to gradually return to your normal routine as soon as possible to reduce pain, avoid stiffness, and avoid loss of muscle strength. Generally, symptoms should improve within 6 weeks of treatment. However, recovery time varies.  Follow these instructions at home:  Managing pain, stiffness, and swelling     Image   If directed, apply ice to the injured area during the first 24 hours after your injury.  Put ice in a plastic bag.Place a towel between your skin and the bag.Leave the ice on for 20 minutes, 2–3 times a day.If directed, apply heat to the affected area as often as told by your health care provider. Use the heat source that your health care provider recommends, such as a moist heat pack or a heating pad.  Place a towel between your skin and the heat source.Leave the heat on for 20–30 minutes.Remove the heat if your skin turns bright red. This is especially important if you are unable to feel pain, heat, or cold. You may have a greater risk of getting burned.Activity     Rest and return to your normal activities as told by your health care provider. Ask your health care provider what activities are safe for you.Avoid activities that take a lot of effort (are strenuous) for as long as told by your health care provider.Do exercises as told by your health care provider.General instructions       Take over-the-counter and prescription medicines only as told by your health care provider.If you have questions or concerns about safety while taking pain medicine, talk with your health care provider.Do not drive or operate heavy machinery until you know how your pain medicine affects you.Do not use any tobacco products, such as cigarettes, chewing tobacco, and e-cigarettes. Tobacco can delay bone healing. If you need help quitting, ask your health care provider.Keep all follow-up visits as told by your health care provider. This is important.How is this prevented?  Warm up and stretch before being active.Cool down and stretch after being active.Give your body time to rest between periods of activity.Avoid:  Being physically inactive for long periods at a time.Exercising or playing sports when you are tired or in pain.Use correct form when playing sports and lifting heavy objects.Use good posture when sitting and standing.Maintain a healthy weight.Sleep on a mattress with medium firmness to support your back.Make sure to use equipment that fits you, including shoes that fit well.Be safe and responsible while being active to avoid falls.Do at least 150 minutes of moderate-intensity exercise each week, such as brisk walking or water aerobics. Try a form of exercise that takes stress off your back, such as swimming or stationary cycling.Maintain physical fitness, including:  Strength. In particular, develop and maintain strong abdominal muscles.Flexibility.Cardiovascular fitness.Endurance.Contact a health care provider if:  Your back pain does not improve after 6 weeks of treatment.Your symptoms get worse.Get help right away if:  Your back pain is severe.You are unable to stand or walk.You develop pain in your legs.You develop weakness in your buttocks or legs.You have difficulty controlling when you urinate or when you have a bowel movement.This information is not intended to replace advice given to you by your health care provider. Make sure you discuss any questions you have with your health care provider.    Document Released: 12/18/2006 Document Revised: 08/24/2017 Document Reviewed: 09/28/2016  [employer] Interactive Patient Education © 2019 [employer]

## 2021-11-10 NOTE — ED PROVIDER NOTE - CARE PROVIDER_API CALL
Sergio Walker (MD)  Anesthesiology; Pain Medicine  [address] [place] [address]  Phone: [phone]  Fax: [phone]  Follow Up Time: 1-3 Days    [facility],   Phone: (   )    -  Fax: (   )    -  Follow Up Time: 1-3 Days

## 2021-11-10 NOTE — ED PROVIDER NOTE - NSFOLLOWUPCLINICS_GEN_ALL_ED_FT
[facility] ([facility])  Rehabilitation  [facility] [address] [place] [address]  Phone: [phone]  Fax:   Follow Up Time: 1-3 Days

## 2021-11-10 NOTE — ED PROVIDER NOTE - PATIENT PORTAL LINK FT
You can access the FollowMyHealth Patient Portal offered by [facility] by registering at the following website: [email or web address]. By joining [employer]’s FollowMyHealth portal, you will also be able to view your health information using other applications (apps) compatible with our system.

## 2021-11-10 NOTE — ED PROVIDER NOTE - MUSCULOSKELETAL, MLM
Spine appears normal, range of motion is not limited, no midline tenderness.  (+) Right lower lumbar paraspinal tenderness with spasm.

## 2021-11-10 NOTE — ED PROVIDER NOTE - NSICDXPASTMEDICALHX_GEN_ALL_CORE_FT
PAST MEDICAL HISTORY:  BPH (benign prostatic hyperplasia)     CAD (coronary artery disease)     HLD (hyperlipidemia)     HTN (hypertension)

## 2023-01-01 ENCOUNTER — EMERGENCY (EMERGENCY)
Facility: HOSPITAL | Age: 88
LOS: 0 days | Discharge: HOME | End: 2023-11-10
Attending: EMERGENCY MEDICINE
Payer: COMMERCIAL

## 2023-01-01 ENCOUNTER — EMERGENCY (EMERGENCY)
Facility: HOSPITAL | Age: 88
LOS: 0 days | Discharge: HOME | End: 2023-10-27
Attending: EMERGENCY MEDICINE
Payer: MEDICARE

## 2023-01-01 VITALS
TEMPERATURE: 98 F | HEART RATE: 65 BPM | OXYGEN SATURATION: 96 % | HEIGHT: 65 IN | WEIGHT: 130.07 LBS | DIASTOLIC BLOOD PRESSURE: 82 MMHG | SYSTOLIC BLOOD PRESSURE: 184 MMHG | RESPIRATION RATE: 18 BRPM

## 2023-01-01 VITALS
TEMPERATURE: 98 F | HEIGHT: 65 IN | SYSTOLIC BLOOD PRESSURE: 169 MMHG | WEIGHT: 134.92 LBS | OXYGEN SATURATION: 98 % | DIASTOLIC BLOOD PRESSURE: 72 MMHG | RESPIRATION RATE: 20 BRPM | HEART RATE: 83 BPM

## 2023-01-01 VITALS
HEART RATE: 66 BPM | RESPIRATION RATE: 18 BRPM | TEMPERATURE: 98 F | OXYGEN SATURATION: 99 % | SYSTOLIC BLOOD PRESSURE: 176 MMHG | DIASTOLIC BLOOD PRESSURE: 80 MMHG

## 2023-01-01 DIAGNOSIS — E78.5 HYPERLIPIDEMIA, UNSPECIFIED: ICD-10-CM

## 2023-01-01 DIAGNOSIS — K62.89 OTHER SPECIFIED DISEASES OF ANUS AND RECTUM: ICD-10-CM

## 2023-01-01 DIAGNOSIS — I51.7 CARDIOMEGALY: ICD-10-CM

## 2023-01-01 DIAGNOSIS — K59.00 CONSTIPATION, UNSPECIFIED: ICD-10-CM

## 2023-01-01 DIAGNOSIS — R07.2 PRECORDIAL PAIN: ICD-10-CM

## 2023-01-01 DIAGNOSIS — K64.4 RESIDUAL HEMORRHOIDAL SKIN TAGS: ICD-10-CM

## 2023-01-01 DIAGNOSIS — K56.41 FECAL IMPACTION: ICD-10-CM

## 2023-01-01 DIAGNOSIS — Z79.02 LONG TERM (CURRENT) USE OF ANTITHROMBOTICS/ANTIPLATELETS: ICD-10-CM

## 2023-01-01 DIAGNOSIS — Z95.5 PRESENCE OF CORONARY ANGIOPLASTY IMPLANT AND GRAFT: ICD-10-CM

## 2023-01-01 DIAGNOSIS — I10 ESSENTIAL (PRIMARY) HYPERTENSION: ICD-10-CM

## 2023-01-01 DIAGNOSIS — Z87.891 PERSONAL HISTORY OF NICOTINE DEPENDENCE: ICD-10-CM

## 2023-01-01 DIAGNOSIS — I25.10 ATHEROSCLEROTIC HEART DISEASE OF NATIVE CORONARY ARTERY WITHOUT ANGINA PECTORIS: ICD-10-CM

## 2023-01-01 DIAGNOSIS — I45.10 UNSPECIFIED RIGHT BUNDLE-BRANCH BLOCK: ICD-10-CM

## 2023-01-01 LAB
ALBUMIN SERPL ELPH-MCNC: 3.9 G/DL — SIGNIFICANT CHANGE UP (ref 3.5–5.2)
ALBUMIN SERPL ELPH-MCNC: 3.9 G/DL — SIGNIFICANT CHANGE UP (ref 3.5–5.2)
ALP SERPL-CCNC: 68 U/L — SIGNIFICANT CHANGE UP (ref 30–115)
ALP SERPL-CCNC: 68 U/L — SIGNIFICANT CHANGE UP (ref 30–115)
ALT FLD-CCNC: 23 U/L — SIGNIFICANT CHANGE UP (ref 0–41)
ALT FLD-CCNC: 23 U/L — SIGNIFICANT CHANGE UP (ref 0–41)
ANION GAP SERPL CALC-SCNC: 8 MMOL/L — SIGNIFICANT CHANGE UP (ref 7–14)
ANION GAP SERPL CALC-SCNC: 8 MMOL/L — SIGNIFICANT CHANGE UP (ref 7–14)
AST SERPL-CCNC: 35 U/L — SIGNIFICANT CHANGE UP (ref 0–41)
AST SERPL-CCNC: 35 U/L — SIGNIFICANT CHANGE UP (ref 0–41)
BASOPHILS # BLD AUTO: 0.05 K/UL — SIGNIFICANT CHANGE UP (ref 0–0.2)
BASOPHILS # BLD AUTO: 0.05 K/UL — SIGNIFICANT CHANGE UP (ref 0–0.2)
BASOPHILS NFR BLD AUTO: 0.9 % — SIGNIFICANT CHANGE UP (ref 0–1)
BASOPHILS NFR BLD AUTO: 0.9 % — SIGNIFICANT CHANGE UP (ref 0–1)
BILIRUB SERPL-MCNC: 0.5 MG/DL — SIGNIFICANT CHANGE UP (ref 0.2–1.2)
BILIRUB SERPL-MCNC: 0.5 MG/DL — SIGNIFICANT CHANGE UP (ref 0.2–1.2)
BUN SERPL-MCNC: 24 MG/DL — HIGH (ref 10–20)
BUN SERPL-MCNC: 24 MG/DL — HIGH (ref 10–20)
CALCIUM SERPL-MCNC: 8.8 MG/DL — SIGNIFICANT CHANGE UP (ref 8.4–10.5)
CALCIUM SERPL-MCNC: 8.8 MG/DL — SIGNIFICANT CHANGE UP (ref 8.4–10.5)
CHLORIDE SERPL-SCNC: 107 MMOL/L — SIGNIFICANT CHANGE UP (ref 98–110)
CHLORIDE SERPL-SCNC: 107 MMOL/L — SIGNIFICANT CHANGE UP (ref 98–110)
CO2 SERPL-SCNC: 25 MMOL/L — SIGNIFICANT CHANGE UP (ref 17–32)
CO2 SERPL-SCNC: 25 MMOL/L — SIGNIFICANT CHANGE UP (ref 17–32)
CREAT SERPL-MCNC: 1.1 MG/DL — SIGNIFICANT CHANGE UP (ref 0.7–1.5)
CREAT SERPL-MCNC: 1.1 MG/DL — SIGNIFICANT CHANGE UP (ref 0.7–1.5)
EGFR: 62 ML/MIN/1.73M2 — SIGNIFICANT CHANGE UP
EGFR: 62 ML/MIN/1.73M2 — SIGNIFICANT CHANGE UP
EOSINOPHIL # BLD AUTO: 0.01 K/UL — SIGNIFICANT CHANGE UP (ref 0–0.7)
EOSINOPHIL # BLD AUTO: 0.01 K/UL — SIGNIFICANT CHANGE UP (ref 0–0.7)
EOSINOPHIL NFR BLD AUTO: 0.2 % — SIGNIFICANT CHANGE UP (ref 0–8)
EOSINOPHIL NFR BLD AUTO: 0.2 % — SIGNIFICANT CHANGE UP (ref 0–8)
GLUCOSE SERPL-MCNC: 113 MG/DL — HIGH (ref 70–99)
GLUCOSE SERPL-MCNC: 113 MG/DL — HIGH (ref 70–99)
HCT VFR BLD CALC: 37.1 % — LOW (ref 42–52)
HCT VFR BLD CALC: 37.1 % — LOW (ref 42–52)
HGB BLD-MCNC: 12.1 G/DL — LOW (ref 14–18)
HGB BLD-MCNC: 12.1 G/DL — LOW (ref 14–18)
IMM GRANULOCYTES NFR BLD AUTO: 0.2 % — SIGNIFICANT CHANGE UP (ref 0.1–0.3)
IMM GRANULOCYTES NFR BLD AUTO: 0.2 % — SIGNIFICANT CHANGE UP (ref 0.1–0.3)
LYMPHOCYTES # BLD AUTO: 1.45 K/UL — SIGNIFICANT CHANGE UP (ref 1.2–3.4)
LYMPHOCYTES # BLD AUTO: 1.45 K/UL — SIGNIFICANT CHANGE UP (ref 1.2–3.4)
LYMPHOCYTES # BLD AUTO: 26.8 % — SIGNIFICANT CHANGE UP (ref 20.5–51.1)
LYMPHOCYTES # BLD AUTO: 26.8 % — SIGNIFICANT CHANGE UP (ref 20.5–51.1)
MAGNESIUM SERPL-MCNC: 2.2 MG/DL — SIGNIFICANT CHANGE UP (ref 1.8–2.4)
MAGNESIUM SERPL-MCNC: 2.2 MG/DL — SIGNIFICANT CHANGE UP (ref 1.8–2.4)
MCHC RBC-ENTMCNC: 31.1 PG — HIGH (ref 27–31)
MCHC RBC-ENTMCNC: 31.1 PG — HIGH (ref 27–31)
MCHC RBC-ENTMCNC: 32.6 G/DL — SIGNIFICANT CHANGE UP (ref 32–37)
MCHC RBC-ENTMCNC: 32.6 G/DL — SIGNIFICANT CHANGE UP (ref 32–37)
MCV RBC AUTO: 95.4 FL — HIGH (ref 80–94)
MCV RBC AUTO: 95.4 FL — HIGH (ref 80–94)
MONOCYTES # BLD AUTO: 0.52 K/UL — SIGNIFICANT CHANGE UP (ref 0.1–0.6)
MONOCYTES # BLD AUTO: 0.52 K/UL — SIGNIFICANT CHANGE UP (ref 0.1–0.6)
MONOCYTES NFR BLD AUTO: 9.6 % — HIGH (ref 1.7–9.3)
MONOCYTES NFR BLD AUTO: 9.6 % — HIGH (ref 1.7–9.3)
NEUTROPHILS # BLD AUTO: 3.38 K/UL — SIGNIFICANT CHANGE UP (ref 1.4–6.5)
NEUTROPHILS # BLD AUTO: 3.38 K/UL — SIGNIFICANT CHANGE UP (ref 1.4–6.5)
NEUTROPHILS NFR BLD AUTO: 62.3 % — SIGNIFICANT CHANGE UP (ref 42.2–75.2)
NEUTROPHILS NFR BLD AUTO: 62.3 % — SIGNIFICANT CHANGE UP (ref 42.2–75.2)
NRBC # BLD: 0 /100 WBCS — SIGNIFICANT CHANGE UP (ref 0–0)
NRBC # BLD: 0 /100 WBCS — SIGNIFICANT CHANGE UP (ref 0–0)
NT-PROBNP SERPL-SCNC: 889 PG/ML — HIGH (ref 0–300)
NT-PROBNP SERPL-SCNC: 889 PG/ML — HIGH (ref 0–300)
PLATELET # BLD AUTO: 110 K/UL — LOW (ref 130–400)
PLATELET # BLD AUTO: 110 K/UL — LOW (ref 130–400)
PMV BLD: 12.3 FL — HIGH (ref 7.4–10.4)
PMV BLD: 12.3 FL — HIGH (ref 7.4–10.4)
POTASSIUM SERPL-MCNC: 5.2 MMOL/L — HIGH (ref 3.5–5)
POTASSIUM SERPL-MCNC: 5.2 MMOL/L — HIGH (ref 3.5–5)
POTASSIUM SERPL-SCNC: 5.2 MMOL/L — HIGH (ref 3.5–5)
POTASSIUM SERPL-SCNC: 5.2 MMOL/L — HIGH (ref 3.5–5)
PROT SERPL-MCNC: 6.9 G/DL — SIGNIFICANT CHANGE UP (ref 6–8)
PROT SERPL-MCNC: 6.9 G/DL — SIGNIFICANT CHANGE UP (ref 6–8)
RBC # BLD: 3.89 M/UL — LOW (ref 4.7–6.1)
RBC # BLD: 3.89 M/UL — LOW (ref 4.7–6.1)
RBC # FLD: 13.5 % — SIGNIFICANT CHANGE UP (ref 11.5–14.5)
RBC # FLD: 13.5 % — SIGNIFICANT CHANGE UP (ref 11.5–14.5)
SODIUM SERPL-SCNC: 140 MMOL/L — SIGNIFICANT CHANGE UP (ref 135–146)
SODIUM SERPL-SCNC: 140 MMOL/L — SIGNIFICANT CHANGE UP (ref 135–146)
TROPONIN T SERPL-MCNC: <0.01 NG/ML — SIGNIFICANT CHANGE UP
WBC # BLD: 5.42 K/UL — SIGNIFICANT CHANGE UP (ref 4.8–10.8)
WBC # BLD: 5.42 K/UL — SIGNIFICANT CHANGE UP (ref 4.8–10.8)
WBC # FLD AUTO: 5.42 K/UL — SIGNIFICANT CHANGE UP (ref 4.8–10.8)
WBC # FLD AUTO: 5.42 K/UL — SIGNIFICANT CHANGE UP (ref 4.8–10.8)

## 2023-01-01 PROCEDURE — 93010 ELECTROCARDIOGRAM REPORT: CPT

## 2023-01-01 PROCEDURE — 99283 EMERGENCY DEPT VISIT LOW MDM: CPT

## 2023-01-01 PROCEDURE — 96374 THER/PROPH/DIAG INJ IV PUSH: CPT

## 2023-01-01 PROCEDURE — 99284 EMERGENCY DEPT VISIT MOD MDM: CPT

## 2023-01-01 PROCEDURE — 85025 COMPLETE CBC W/AUTO DIFF WBC: CPT

## 2023-01-01 PROCEDURE — 99285 EMERGENCY DEPT VISIT HI MDM: CPT

## 2023-01-01 PROCEDURE — 83735 ASSAY OF MAGNESIUM: CPT

## 2023-01-01 PROCEDURE — 83880 ASSAY OF NATRIURETIC PEPTIDE: CPT

## 2023-01-01 PROCEDURE — 99236 HOSP IP/OBS SAME DATE HI 85: CPT

## 2023-01-01 PROCEDURE — 71045 X-RAY EXAM CHEST 1 VIEW: CPT | Mod: 26

## 2023-01-01 PROCEDURE — 71045 X-RAY EXAM CHEST 1 VIEW: CPT

## 2023-01-01 PROCEDURE — 99285 EMERGENCY DEPT VISIT HI MDM: CPT | Mod: 25

## 2023-01-01 PROCEDURE — 36415 COLL VENOUS BLD VENIPUNCTURE: CPT

## 2023-01-01 PROCEDURE — 80053 COMPREHEN METABOLIC PANEL: CPT

## 2023-01-01 PROCEDURE — G0378: CPT

## 2023-01-01 PROCEDURE — 93005 ELECTROCARDIOGRAM TRACING: CPT

## 2023-01-01 PROCEDURE — 84484 ASSAY OF TROPONIN QUANT: CPT

## 2023-01-01 RX ORDER — ATORVASTATIN CALCIUM 80 MG/1
40 TABLET, FILM COATED ORAL AT BEDTIME
Refills: 0 | Status: DISCONTINUED | OUTPATIENT
Start: 2023-01-01 | End: 2023-01-01

## 2023-01-01 RX ORDER — CLOPIDOGREL BISULFATE 75 MG/1
75 TABLET, FILM COATED ORAL DAILY
Refills: 0 | Status: DISCONTINUED | OUTPATIENT
Start: 2023-01-01 | End: 2023-01-01

## 2023-01-01 RX ORDER — PANTOPRAZOLE SODIUM 20 MG/1
40 TABLET, DELAYED RELEASE ORAL
Refills: 0 | Status: DISCONTINUED | OUTPATIENT
Start: 2023-01-01 | End: 2023-01-01

## 2023-01-01 RX ORDER — CLONAZEPAM 1 MG
0.5 TABLET ORAL ONCE
Refills: 0 | Status: DISCONTINUED | OUTPATIENT
Start: 2023-01-01 | End: 2023-01-01

## 2023-01-01 RX ORDER — NITROGLYCERIN 6.5 MG
0.4 CAPSULE, EXTENDED RELEASE ORAL
Refills: 0 | Status: COMPLETED | OUTPATIENT
Start: 2023-01-01 | End: 2023-01-01

## 2023-01-01 RX ORDER — METOPROLOL TARTRATE 50 MG
25 TABLET ORAL DAILY
Refills: 0 | Status: DISCONTINUED | OUTPATIENT
Start: 2023-01-01 | End: 2023-01-01

## 2023-01-01 RX ORDER — ASPIRIN/CALCIUM CARB/MAGNESIUM 324 MG
162 TABLET ORAL ONCE
Refills: 0 | Status: COMPLETED | OUTPATIENT
Start: 2023-01-01 | End: 2023-01-01

## 2023-01-01 RX ORDER — AMLODIPINE BESYLATE 2.5 MG/1
5 TABLET ORAL ONCE
Refills: 0 | Status: COMPLETED | OUTPATIENT
Start: 2023-01-01 | End: 2023-01-01

## 2023-01-01 RX ORDER — MORPHINE SULFATE 50 MG/1
2 CAPSULE, EXTENDED RELEASE ORAL ONCE
Refills: 0 | Status: DISCONTINUED | OUTPATIENT
Start: 2023-01-01 | End: 2023-01-01

## 2023-01-01 RX ORDER — SODIUM CHLORIDE 9 MG/ML
500 INJECTION INTRAMUSCULAR; INTRAVENOUS; SUBCUTANEOUS ONCE
Refills: 0 | Status: COMPLETED | OUTPATIENT
Start: 2023-01-01 | End: 2023-01-01

## 2023-01-01 RX ADMIN — MORPHINE SULFATE 2 MILLIGRAM(S): 50 CAPSULE, EXTENDED RELEASE ORAL at 06:35

## 2023-01-01 RX ADMIN — Medication 25 MILLIGRAM(S): at 05:16

## 2023-01-01 RX ADMIN — SODIUM CHLORIDE 500 MILLILITER(S): 9 INJECTION INTRAMUSCULAR; INTRAVENOUS; SUBCUTANEOUS at 22:44

## 2023-01-01 RX ADMIN — Medication 162 MILLIGRAM(S): at 21:59

## 2023-01-01 RX ADMIN — Medication 0.4 MILLIGRAM(S): at 22:29

## 2023-01-01 RX ADMIN — Medication 0.4 MILLIGRAM(S): at 22:24

## 2023-01-01 RX ADMIN — AMLODIPINE BESYLATE 5 MILLIGRAM(S): 2.5 TABLET ORAL at 05:21

## 2023-01-01 RX ADMIN — Medication 0.4 MILLIGRAM(S): at 22:34

## 2023-01-01 RX ADMIN — ATORVASTATIN CALCIUM 40 MILLIGRAM(S): 80 TABLET, FILM COATED ORAL at 05:16

## 2023-03-06 NOTE — ED CDU PROVIDER INITIAL DAY NOTE - CARDIOVASCULAR NEGATIVE STATEMENT, MLM
O2 Device/Concentration: Flow (L/min): 5, Oxygen Concentration (%): 100,  , Flow (L/min): 5      Plan of Care Patient maintained on nathaniel 20 ppm   no chest pain and no edema.

## 2023-07-20 NOTE — ED PROVIDER NOTE - EKG #1 DATE/TIME
[address] [address]  Phone: [phone]   Fax:     [phone]    Physical Therapy  Cancellation/No-show Note  Patient Name:  Emilia Blackwell  :  1962   Date:  2023  Cancelled visits to date: 1  No-shows to date: 2    Patient status for today's appointment patient:  []  Cancelled  []  Rescheduled appointment  [x]  No-show     Reason given by patient:  []  Patient ill  []  Conflicting appointment  []  No transportation    []  Conflict with work  [x]  No reason given  [x]  Other:     Comments:  2 nd consecutive no show    Phone call information:   []  Phone call made today to patient at _ time at number provided:      []  Patient answered, conversation as follows:    []  Patient did not answer, message left as follows: No message left as his answering service was unavailable.   []  Phone call not made today    Electronically signed by:  Rosalba Connell, PT 17-Jul-2019 21:44

## 2023-10-26 NOTE — ED PROVIDER NOTE - IV ALTEPLASE INCLUSION HIDDEN
----- Message from Janet Christopher 
show

## 2023-10-26 NOTE — ED PROVIDER NOTE - NSICDXPASTMEDICALHX_GEN_ALL_CORE_FT
PAST MEDICAL HISTORY:  BPH (benign prostatic hyperplasia)     CAD (coronary artery disease)     HLD (hyperlipidemia)     HTN (hypertension)      ACUTE PANCREATITIS

## 2023-10-26 NOTE — ED PROVIDER NOTE - NS ED ATTENDING STATEMENT MOD
This was a shared visit with the DEANDRA. I reviewed and verified the documentation and independently performed the documented:

## 2023-10-26 NOTE — ED PROVIDER NOTE - OBJECTIVE STATEMENT
89 yold male to ED Pmhx Htn, HLd, CAd with stents, sciatica, degenerative disk disease c/o mid sternal chest pain radiating to both arm last night and again today lasting ~20 min described as pressure and resolved spontaneously; pt denies n/v/diaphoresis, neck or back pain; pt unsure of last cardiac workup date; pt deneis fever, chills, cough;

## 2023-10-26 NOTE — ED PROVIDER NOTE - ATTENDING SHARED VISIT SELECTORS
Staff returned call to patient in regard to scheduling an injection with Pain Provider Dr. Mert Coates MD    Patient did not answer nor could staff leave a voicemail to inform patient she would have to schedule an In Person, Audio, or Virtual Visit with provider due to her last appointment being December 2019 with Clinic.    If patient returns call please have her schedule with provider with one of following visit types.   History/Exam/Medical Decision Making

## 2023-10-26 NOTE — ED PROVIDER NOTE - PHYSICAL EXAMINATION
Constitutional: Well developed, well nourished. NAD  Head: Normocephalic, atraumatic.  Eyes: PERRL, EOMI.  ENT: No nasal discharge. Mucous membranes dry.  Neck: Supple. Painless ROM.  Cardiovascular: Regular rate and rhythm.  Pulmonary:   Lungs clear to auscultation bilaterally.    Abdominal: Soft. Nondistended. No rebound, guarding, rigidity.  Extremities. Pelvis stable. No lower extremity edema, symmetric calves.  Skin: No rashes, cyanosis.  Neuro: AAOx3. No focal neurological deficits.  Psych: Normal mood. Normal affect.

## 2023-10-26 NOTE — ED PROVIDER NOTE - ATTENDING APP SHARED VISIT CONTRIBUTION OF CARE
89-year-old male past medical history of CAD stents hypertension hyperlipidemia ex-smoker follows with Dr. Martinez cardiology presents with chest pain.  Started today while at rest substernal pressure constant radiated to bilateral upper arms.  Now resolved after meds given in ED.  No shortness of breath palpitations nausea vomiting diaphoresis.  No fever cough.  No trauma.  No tearing back pain.  No leg pain swelling immobilization hormones hemoptysis.  Unknown last prior stress test.    On exam, AFVSS, Well appearing, No acute distress, NCAT, EOMI, PERRLA, MMM, Neck supple, LCTAB, RRR nl s1s2 No mrg, Abdomen Soft NTND, AAOx3, No Focal Deficits, No LE edema or calf TTP,    A/P; chest pain high risk rule out ACS labs EKG troponin chest x-ray telemetry monitor aspirin pain control reeval

## 2023-10-27 NOTE — ED ADULT NURSE NOTE - NSFALLHARMRISKINTERV_ED_ALL_ED
Assistance OOB with selected safe patient handling equipment if applicable/Communicate risk of Fall with Harm to all staff, patient, and family/Provide visual cue: red socks, yellow wristband, yellow gown, etc/Reinforce activity limits and safety measures with patient and family/Bed in lowest position, wheels locked, appropriate side rails in place/Call bell, personal items and telephone in reach/Instruct patient to call for assistance before getting out of bed/chair/stretcher/Non-slip footwear applied when patient is off stretcher/[facility] to call system/Physically safe environment - no spills, clutter or unnecessary equipment/Purposeful Proactive Rounding/Room/bathroom lighting operational, light cord in reach

## 2023-10-27 NOTE — ED CDU PROVIDER INITIAL DAY NOTE - CLINICAL SUMMARY MEDICAL DECISION MAKING FREE TEXT BOX
89 yold male to ED Pmhx Htn, HLd, CAd with stents, was placed in observation for evaluation of CP. Pt had labs which demonstrated a negative troponin. EKG NSR. CXR normal. Pending repeat EKG. 89 yo male to ED Pmhx Htn, HLd, CAd with stents, was placed in observation for evaluation of CP. Pt had labs which demonstrated a negative troponin. EKG NSR. CXR normal. Pending repeat EKG and troponin.

## 2023-10-27 NOTE — ED CDU PROVIDER DISPOSITION NOTE - CARE PROVIDER_API CALL
Jerrell Martinez  Cardiology  [address] [place] [address]  Phone: [phone]  Fax: [phone]  Follow Up Time: 1-3 Days

## 2023-10-27 NOTE — ED CDU PROVIDER DISPOSITION NOTE - CLINICAL COURSE
89 yo male to ED Pmhx Htn, HLd, CAd with stents, was placed in observation for evaluation of CP. Pt had labs which demonstrated a negative troponinx2 EKG NSR x2. CXR normal. Pt has an appointment with his cardiologist next week. DC home with family and strict return precautions.

## 2023-10-27 NOTE — ED CDU PROVIDER DISPOSITION NOTE - PATIENT PORTAL LINK FT
You can access the FollowMyHealth Patient Portal offered by [facility] by registering at the following website: [email or web address]. By joining [employer]’s FollowMyHealth portal, you will also be able to view your health information using other applications (apps) compatible with our system.

## 2023-10-27 NOTE — ED ADULT NURSE REASSESSMENT NOTE - NS ED NURSE REASSESS COMMENT FT1
pt complaining of chest pain; BP and HR taken and recorded; PA Fearns made aware and ordered to give BP meds and morphine. due meds given. to monitor.

## 2023-11-10 NOTE — ED PROVIDER NOTE - PHYSICAL EXAMINATION
Constitutional: Uncomfortable appearing Non toxic.   Eyes: PERRLA. Extraocular movements intact, no entrapment. Conjunctiva normal.   ENT: No nasal discharge. Moist mucus membranes.  Neck: Supple, non tender, full range of motion.  CV: RRR no murmurs, rubs, or gallops. +S1S2.   Pulm: Clear to auscultation bilaterally. Normal work of breathing.  Abd: soft NT ND +BS.   Rectal: large vol stool in vault, minimal external hemorrhoids to site, no bleeding internally or externally.   Ext: Warm and well perfused x4, moving all extremities, no edema.   Psy: Cooperative, appropriate.   Skin: Warm, dry, no rash  Neuro: nonfocal

## 2023-11-10 NOTE — ED PROVIDER NOTE - NSFOLLOWUPINSTRUCTIONS_ED_ALL_ED_FT
Hemorrhoids    Hemorrhoids are swollen veins in and around the rectum or anus. There are two types of hemorrhoids:     Internal hemorrhoids. These occur in the veins that are just inside the rectum. They may poke through to the outside and become irritated and painful.  External hemorrhoids. These occur in the veins that are outside of the anus and can be felt as a painful swelling or hard lump near the anus.    Most hemorrhoids do not cause serious problems, and they can be managed with home treatments such as diet and lifestyle changes. If home treatments do not help your symptoms, procedures can be done to shrink or remove the hemorrhoids.    CAUSES  This condition is caused by increased pressure in the anal area. This pressure may result from various things, including:    Constipation.  Straining to have a bowel movement.  Diarrhea.  Pregnancy.  Obesity.  Sitting for long periods of time.  Heavy lifting or other activity that causes you to strain.  Anal sex.    SYMPTOMS  Symptoms of this condition include:    Pain.  Anal itching or irritation.  Rectal bleeding.  Leakage of stool (feces).  Anal swelling.  One or more lumps around the anus.    DIAGNOSIS  This condition can often be diagnosed through a visual exam. Other exams or tests may also be done, such as:    Examination of the rectal area with a gloved hand (digital rectal exam).  Examination of the anal canal using a small tube (anoscope).  A blood test, if you have lost a significant amount of blood.  A test to look inside the colon (sigmoidoscopy or colonoscopy).    TREATMENT  This condition can usually be treated at home. However, various procedures may be done if dietary changes, lifestyle changes, and other home treatments do not help your symptoms. These procedures can help make the hemorrhoids smaller or remove them completely. Some of these procedures involve surgery, and others do not. Common procedures include:    Rubber band ligation. Rubber bands are placed at the base of the hemorrhoids to cut off the blood supply to them.  Sclerotherapy. Medicine is injected into the hemorrhoids to shrink them.  Infrared coagulation. A type of light energy is used to get rid of the hemorrhoids.  Hemorrhoidectomy surgery. The hemorrhoids are surgically removed, and the veins that supply them are tied off.  Stapled hemorrhoidopexy surgery. A circular stapling device is used to remove the hemorrhoids and use staples to cut off the blood supply to them.    HOME CARE INSTRUCTIONS  Eating and Drinking    Eat foods that have a lot of fiber in them, such as whole grains, beans, nuts, fruits, and vegetables. Ask your health care provider about taking products that have added fiber (fiber supplements).  Drink enough fluid to keep your urine clear or pale yellow.    Managing Pain and Swelling    Take warm sitz baths for 20 minutes, 3–4 times a day to ease pain and discomfort.  If directed, apply ice to the affected area. Using ice packs between sitz baths may be helpful.  Put ice in a plastic bag.  Place a towel between your skin and the bag.  Leave the ice on for 20 minutes, 2–3 times a day.    General Instructions    Take over-the-counter and prescription medicines only as told by your health care provider.  Use medicated creams or suppositories as told.  Exercise regularly.  Go to the bathroom when you have the urge to have a bowel movement. Do not wait.  Avoid straining to have bowel movements.  Keep the anal area dry and clean. Use wet toilet paper or moist towelettes after a bowel movement.  Do not sit on the toilet for long periods of time. This increases blood pooling and pain.    SEEK MEDICAL CARE IF:  You have increasing pain and swelling that are not controlled by treatment or medicine.  You have uncontrolled bleeding.  You have difficulty having a bowel movement, or you are unable to have a bowel movement.  You have pain or inflammation outside the area of the hemorrhoids.    ADDITIONAL NOTES AND INSTRUCTIONS    Please follow up with your Primary MD in 24-48 hr.  Seek immediate medical care for any new/worsening signs or symptoms.     Constipation    Constipation is when a person has fewer than three bowel movements a week, has difficulty having a bowel movement, or has stools that are dry, hard, or larger than normal. Other symptoms can include abdominal pain or bloating. As people grow older, constipation is more common. A low-fiber diet, not taking in enough fluids, and taking certain medicines may make constipation worse. Treatment varies but may include dietary modifications (more fiber-rich foods), lifestyle modifications, and possible medications.     SEEK IMMEDIATE MEDICAL CARE IF YOU HAVE THE FOLLOWING SYMPTOMS: bright red blood in your stool, constipation for longer than 4 days, abdominal or rectal pain, unexplained weight loss, or inability to pass gas.

## 2023-11-10 NOTE — ED PROVIDER NOTE - CARE PROVIDER_API CALL
José Miguel Aldrich  Colon/Rectal Surgery  [address] [facility] [address] [place] [address]  Phone: [phone]  Fax: [phone]  Follow Up Time:

## 2023-11-10 NOTE — ED PROVIDER NOTE - CLINICAL SUMMARY MEDICAL DECISION MAKING FREE TEXT BOX
89yo M history of HTN DL CAD PCI presenting for rectal pain. Per pt, states he has been constipated for the past 2d, last BM 2d ago hard, painful. Today noted bleeding from hemorrhoids. No abdominal pain, fevers/chills, nausea/vomiting/diarrhea. Took stool softeners earlier today with no relief. No anticoagulation. pt found to have impacted stool in vault. remaining exam as above. sp manual disimpaction, pt feeling significantly improved. topical lidocaine applied to region. abdomen s nt nd. advised on maintaining bowel regimen, follow-up with colorectal. Comfortable with discharge and follow-up outpatient, strict return precautions given. Endorses understanding of all of this and aware that they can return at any time for new or concerning symptoms. No further questions or concerns at this time

## 2023-11-10 NOTE — ED PROVIDER NOTE - PATIENT PORTAL LINK FT
You can access the FollowMyHealth Patient Portal offered by [facility] by registering at the following website: [email or web address]. By joining [employer]’s FollowMyHealth portal, you will also be able to view your health information using other applications (apps) compatible with our system.

## 2023-11-10 NOTE — ED PROVIDER NOTE - OBJECTIVE STATEMENT
89yo M history of HTN DL CAD PCI presenting for rectal pain. Per pt, states he has been constipated for the past 2d, last BM 2d ago hard, painful. Today noted bleeding from hemorrhoids. No abdominal pain, fevers/chills, nausea/vomiting/diarrhea. Took stool softeners earlier today with no relief. No anticoagulation.

## 2023-11-16 NOTE — ED ADULT NURSE NOTE - CAS DISCH ACCOMP BY
Subjective:      Patient ID: Shruthi Churchill is a 44 y.o. female.    Vitals:  vitals were not taken for this visit.     Chief Complaint: Anxiety      Visit Type: TELE AUDIOVISUAL    Present with the patient at the time of consultation: TELEMED PRESENT WITH PATIENT: None    Past Medical History:   Diagnosis Date    Anemia     Anxiety     GI bleed     Gilbert's syndrome     Hypertension     no meds    Rectal mass      Past Surgical History:   Procedure Laterality Date    AUGMENTATION OF BREAST      COLONOSCOPY N/A 06/28/2023    Procedure: COLONOSCOPY;  Surgeon: Joey Bateman MD;  Location: [facility];  Service: Endoscopy;  Laterality: N/A;    ESOPHAGOGASTRODUODENOSCOPY N/A 06/28/2023    Procedure: EGD (ESOPHAGOGASTRODUODENOSCOPY);  Surgeon: Joey Bateman MD;  Location: [facility];  Service: Endoscopy;  Laterality: N/A;    EXCISIONAL HEMORRHOIDECTOMY N/A 10/11/2023    Procedure: HEMORRHOIDECTOMY;  Surgeon: Barbara Zaldivar MD;  Location: [facility] OR;  Service: Colon and Rectal;  Laterality: N/A;  , excisional    OVARIAN CYST REMOVAL      ROBOTIC TAMIS (TRANSANAL MINIMALLY INVASIVE SURGERY) N/A 8/29/2023    Procedure: ROBOTIC TAMIS (TRANSANAL MINIMALLY INVASIVE SURGERY);  Surgeon: Barbara Zaldivar MD;  Location: [facility] OR;  Service: Colon and Rectal;  Laterality: N/A;  prone  need open rectal tray / 2 HOURS     Review of patient's allergies indicates:   Allergen Reactions    Shellfish containing products Swelling     Facial swelling     Current Outpatient Medications on File Prior to Visit   Medication Sig Dispense Refill    diazePAM (VALIUM) 5 MG tablet Take 1 tablet (5 mg total) by mouth every 8 (eight) hours as needed (prn rectal spasm). 30 tablet 0    polycarbophil (FIBERCON) 625 mg tablet Take 2 tabs up to 3 times a day 120 tablet 3    [DISCONTINUED] LIDOcaine 5 % Gel Apply gel to the rectal area 1-2 times daily for pain. 10 g 0    [DISCONTINUED] oxyCODONE (ROXICODONE) 5 MG immediate release tablet  Take 1 tablet (5 mg total) by mouth every 4 (four) hours as needed for Pain. 40 tablet 0     Current Facility-Administered Medications on File Prior to Visit   Medication Dose Route Frequency Provider Last Rate Last Admin    0.9%  NaCl infusion   Intravenous Continuous Yenni Matthew NP        LIDOcaine (PF) 10 mg/ml (1%) injection 10 mg  1 mL Intradermal Once Yenni Matthew NP        mupirocin 2 % ointment   Nasal On Call Procedure Yenni Matthew NP   Given at 10/11/23 1025     History reviewed. No pertinent family history.        Ohs Peq Odvv Intake    11/16/2023  8:51 AM CST - Filed by Patient   Describe your reason for todays visit Severe panic attacks   What is your current physical address in the event of a medical emergency? [address] [place] [address]   Are you able to take your vital signs? No   Please attach any relevant images or files          Hx of Anxiety and panic attacks. Worsening since her recent surgery. Stopped Xanax 6 months ago. Has Klonopin but nit helping. Seeking further treatment options at this time.    Anxiety  Symptoms include nervous/anxious behavior. Patient reports no confusion or suicidal ideas.           Neurological:  Negative for disorientation and altered mental status.   Psychiatric/Behavioral:  Positive for nervous/anxious, sleep disturbance and history of mental illness. Negative for altered mental status, disorientation, confusion, hallucinations, homicidal ideas, suicidal ideas, self-injury and depression. The patient is nervous/anxious.         Objective:   The physical exam was conducted virtually.  Physical Exam   Constitutional: She is oriented to person, place, and time. She is cooperative. She does not appear ill. No distress.   HENT:   Head: Normocephalic and atraumatic.   Nose: Nose normal.   Eyes: Extraocular movement intact   Pulmonary/Chest: Effort normal.   Abdominal: Normal appearance.   Musculoskeletal: Normal range of motion.         General:  Normal range of motion.   Neurological: no focal deficit. She is alert and oriented to person, place, and time.   Psychiatric: She experiences Normal attention and Normal perception. Her speech is normal and behavior is normal. Mood, memory and judgment normal. Cognition normal  Vitals reviewed.      Assessment:     1. Anxiety        Plan:   Patient encouraged to monitor symptoms closely and instructed to follow-up for new or worsening symptoms. Further, in-person, evaluation is necessary for continued treatment. Please follow up with your primary care doctor or specialist as needed. Verbally discussed plan. Patient confirms understanding and is in agreement with treatment and plan.     You must understand that you've received a [facility] Care evaluation only and that you may be released before all your medical problems are known or treated. You, the patient, will arrange for follow up care as instructed.      Anxiety        Patient Instructions   Anxiety Discharge Instructions, Adult   About this topic   Anxiety can cause you to feel very worried. It can also cause physical symptoms like chest pain, stomach aches, or trouble sleeping. While mild anxiety is a normal response to stress, it can cause you problems in your everyday life. You may need follow-up care to help manage your anxiety. Anxiety happens in many forms, like:  Being scared all the time that something bad is going to happen. This is generalized anxiety.  Strong bursts of fear where your body has signs that may feel like a heart attack. This is called a panic attack.  Upsetting thoughts that happen often. There is a need to repeat doing certain things to help get rid of the anxiety caused by these thoughts. The thoughts or actions may be about checking on things, touching things, or worry about germs. This is an obsessive-compulsive disorder.  Strong fear of an object, place, or condition. This is a phobia.  Fear that others think bad things about you  or being put down by other people. This is social anxiety.  Nightmares, flashbacks, staying away from people, or having panic attacks when reminded of a shocking or hurtful time or place from the past. This is post-traumatic stress.  Anxiety disorder may be treated in many ways. Some kinds of treatment have you talk about your beliefs, fears, and worries. You may learn how certain thoughts or feelings can raise anxiety. You may also learn what steps to take to lower anxiety. Other kinds of treatment may have you look back on a hurtful event, sad memory, or something you are afraid of. The doctor will help you deal with the feelings that you may have. You may learn ways to cope with unwanted events or thoughts by looking at your fears in a way that feels safe.  What care is needed at home?   Ask your doctor what you need to do when you go home. Make sure you ask questions if you do not understand what the doctor says.  Set a time to talk with a counselor about your worries and feelings. This can help you with your anxiety.  Take care to follow all instructions when you take your medicines.  Limit alcohol and caffeine.  Learn ways to manage stress. Relaxation methods like reflection, deep breathing, and muscle relaxation may be helpful. Things like yoga, exercise, and sherrie chi are also good.  Talk about your feelings with family members and friends you trust. Talk to someone who can help you see how your thoughts at certain times may raise your anxiety.     What follow-up care is needed?   Your doctor may ask you to make visits to the office to check on your progress. Be sure to keep these visits.  What drugs may be needed?   The doctor may order drugs to help the physical signs of anxiety. Make sure that you take the drugs as taught to you by the doctor. Talk with your doctor about any side effects and ask how long you should take the drug.  Will physical activity be limited?   You may take part in physical  activities. Some people are limited because of their anxiety or fear. Talk with your doctor about the right amount of activity for you.  What changes to diet are needed?   Eat a variety of healthy foods and limit drinks with caffeine. You should avoid alcohol, energy drinks, and over-the-counter stimulants.  What problems could happen?   If your anxiety is not treated, it can result in:  Staying away from work or social events  Not being able to do everyday tasks  Keeping away from family and friends  What can be done to prevent this health problem?   Learn what events, people, or things upset you. Limit your contact with them.  Talk about your feelings. Talk to someone who can help you see how your thoughts at certain times may raise your anxiety.  Seek support from your friends and family. Find someone who calms you down. Ask if you can call them when you are getting anxious.  When do I need to call the doctor?   You feel you may harm yourself or someone else.  You can also call a mental health hotline for help.  You have any physical symptoms, such as chest pain, trouble breathing, or severe belly pain, that could be a sign of a serious problem.  If you are short of breath.  If you do not feel like you can be alone.  Teach Back: Helping You Understand   The Teach Back Method helps you understand the information we are giving you. After you talk with the staff, tell them in your own words what you learned. This helps to make sure the staff has described each thing clearly. It also helps to explain things that may have been confusing. Before going home, make sure you can do these:  I can tell you about my condition and the drugs I need to take.  I can tell you what may help lower my anxiety.  I can tell you what I will do if it is hard to breathe or I have chest pain.  I can tell you what I will do if I do not feel safe or cannot be alone.  Where can I learn more?    [facility]  https://www.tramaine.org/Learn-More/Mental-Health-Conditions/Anxiety-Disorders   National Health Service  https://www.nhs.uk/conditions/generalised-anxiety-disorder/symptoms/   National [place] of Health ? [facility]  https://www.paul.nih.gov/health/relieving-stress-anxiety-iwlgqkwks-aamlflrgut-dyldfizcrx   National [place] of Mental Health  http://www.[facility].nih.gov/health/publications/anxiety-disorders/complete-index.shtml   Last Reviewed Date   2021-06-08  Consumer Information Use and Disclaimer   This information is not specific medical advice and does not replace information you receive from your health care provider. This is only a brief summary of general information. It does NOT include all information about conditions, illnesses, injuries, tests, procedures, treatments, therapies, discharge instructions or life-style choices that may apply to you. You must talk with your health care provider for complete information about your health and treatment options. This information should not be used to decide whether or not to accept your health care providers advice, instructions or recommendations. Only your health care provider has the knowledge and training to provide advice that is right for you.  Copyright   Copyright © 2021 UpTo[employer], Inc. and its affiliates and/or licensors. All rights reserved.               Self

## 2024-01-01 ENCOUNTER — INPATIENT (INPATIENT)
Facility: HOSPITAL | Age: 89
LOS: 8 days | DRG: 283 | End: 2024-02-18
Attending: INTERNAL MEDICINE | Admitting: INTERNAL MEDICINE
Payer: MEDICARE

## 2024-01-01 VITALS
TEMPERATURE: 98 F | RESPIRATION RATE: 16 BRPM | HEART RATE: 75 BPM | DIASTOLIC BLOOD PRESSURE: 61 MMHG | WEIGHT: 169.98 LBS | SYSTOLIC BLOOD PRESSURE: 139 MMHG | OXYGEN SATURATION: 95 %

## 2024-01-01 VITALS
HEART RATE: 67 BPM | OXYGEN SATURATION: 97 % | TEMPERATURE: 98 F | DIASTOLIC BLOOD PRESSURE: 57 MMHG | RESPIRATION RATE: 18 BRPM | SYSTOLIC BLOOD PRESSURE: 112 MMHG

## 2024-01-01 DIAGNOSIS — Z51.5 ENCOUNTER FOR PALLIATIVE CARE: ICD-10-CM

## 2024-01-01 DIAGNOSIS — I63.9 CEREBRAL INFARCTION, UNSPECIFIED: ICD-10-CM

## 2024-01-01 DIAGNOSIS — I21.4 NON-ST ELEVATION (NSTEMI) MYOCARDIAL INFARCTION: ICD-10-CM

## 2024-01-01 DIAGNOSIS — I25.10 ATHEROSCLEROTIC HEART DISEASE OF NATIVE CORONARY ARTERY WITHOUT ANGINA PECTORIS: ICD-10-CM

## 2024-01-01 LAB
A1C WITH ESTIMATED AVERAGE GLUCOSE RESULT: 5.2 % — SIGNIFICANT CHANGE UP (ref 4–5.6)
ALBUMIN SERPL ELPH-MCNC: 3.8 G/DL — SIGNIFICANT CHANGE UP (ref 3.5–5.2)
ALBUMIN SERPL ELPH-MCNC: 4 G/DL — SIGNIFICANT CHANGE UP (ref 3.5–5.2)
ALP SERPL-CCNC: 62 U/L — SIGNIFICANT CHANGE UP (ref 30–115)
ALP SERPL-CCNC: 66 U/L — SIGNIFICANT CHANGE UP (ref 30–115)
ALT FLD-CCNC: 22 U/L — SIGNIFICANT CHANGE UP (ref 0–41)
ALT FLD-CCNC: 23 U/L — SIGNIFICANT CHANGE UP (ref 0–41)
ANION GAP SERPL CALC-SCNC: 11 MMOL/L — SIGNIFICANT CHANGE UP (ref 7–14)
ANION GAP SERPL CALC-SCNC: 14 MMOL/L — SIGNIFICANT CHANGE UP (ref 7–14)
ANION GAP SERPL CALC-SCNC: 15 MMOL/L — HIGH (ref 7–14)
ANION GAP SERPL CALC-SCNC: 19 MMOL/L — HIGH (ref 7–14)
APPEARANCE UR: CLEAR — SIGNIFICANT CHANGE UP
APTT BLD: 107.2 SEC — CRITICAL HIGH (ref 27–39.2)
APTT BLD: 31.5 SEC — SIGNIFICANT CHANGE UP (ref 27–39.2)
APTT BLD: 45.8 SEC — HIGH (ref 27–39.2)
AST SERPL-CCNC: 44 U/L — HIGH (ref 0–41)
AST SERPL-CCNC: 45 U/L — HIGH (ref 0–41)
BACTERIA # UR AUTO: NEGATIVE /HPF — SIGNIFICANT CHANGE UP
BASE EXCESS BLDA CALC-SCNC: -1.7 MMOL/L — SIGNIFICANT CHANGE UP (ref -2–3)
BASOPHILS # BLD AUTO: 0.03 K/UL — SIGNIFICANT CHANGE UP (ref 0–0.2)
BASOPHILS # BLD AUTO: 0.03 K/UL — SIGNIFICANT CHANGE UP (ref 0–0.2)
BASOPHILS # BLD AUTO: 0.04 K/UL — SIGNIFICANT CHANGE UP (ref 0–0.2)
BASOPHILS NFR BLD AUTO: 0.3 % — SIGNIFICANT CHANGE UP (ref 0–1)
BASOPHILS NFR BLD AUTO: 0.3 % — SIGNIFICANT CHANGE UP (ref 0–1)
BASOPHILS NFR BLD AUTO: 0.6 % — SIGNIFICANT CHANGE UP (ref 0–1)
BILIRUB SERPL-MCNC: 1.6 MG/DL — HIGH (ref 0.2–1.2)
BILIRUB SERPL-MCNC: 1.8 MG/DL — HIGH (ref 0.2–1.2)
BILIRUB UR-MCNC: NEGATIVE — SIGNIFICANT CHANGE UP
BUN SERPL-MCNC: 32 MG/DL — HIGH (ref 10–20)
BUN SERPL-MCNC: 35 MG/DL — HIGH (ref 10–20)
BUN SERPL-MCNC: 38 MG/DL — HIGH (ref 10–20)
BUN SERPL-MCNC: 40 MG/DL — HIGH (ref 10–20)
CALCIUM SERPL-MCNC: 8.6 MG/DL — SIGNIFICANT CHANGE UP (ref 8.4–10.4)
CALCIUM SERPL-MCNC: 8.7 MG/DL — SIGNIFICANT CHANGE UP (ref 8.4–10.5)
CALCIUM SERPL-MCNC: 8.9 MG/DL — SIGNIFICANT CHANGE UP (ref 8.4–10.5)
CALCIUM SERPL-MCNC: 9.1 MG/DL — SIGNIFICANT CHANGE UP (ref 8.4–10.5)
CAST: 0 /LPF — SIGNIFICANT CHANGE UP (ref 0–4)
CHLORIDE SERPL-SCNC: 103 MMOL/L — SIGNIFICANT CHANGE UP (ref 98–110)
CHLORIDE SERPL-SCNC: 104 MMOL/L — SIGNIFICANT CHANGE UP (ref 98–110)
CHLORIDE SERPL-SCNC: 105 MMOL/L — SIGNIFICANT CHANGE UP (ref 98–110)
CHLORIDE SERPL-SCNC: 105 MMOL/L — SIGNIFICANT CHANGE UP (ref 98–110)
CHOLEST SERPL-MCNC: 107 MG/DL — SIGNIFICANT CHANGE UP
CO2 SERPL-SCNC: 18 MMOL/L — SIGNIFICANT CHANGE UP (ref 17–32)
CO2 SERPL-SCNC: 20 MMOL/L — SIGNIFICANT CHANGE UP (ref 17–32)
CO2 SERPL-SCNC: 21 MMOL/L — SIGNIFICANT CHANGE UP (ref 17–32)
CO2 SERPL-SCNC: 22 MMOL/L — SIGNIFICANT CHANGE UP (ref 17–32)
COLOR SPEC: YELLOW — SIGNIFICANT CHANGE UP
CREAT SERPL-MCNC: 1.6 MG/DL — HIGH (ref 0.7–1.5)
CREAT SERPL-MCNC: 1.6 MG/DL — HIGH (ref 0.7–1.5)
CREAT SERPL-MCNC: 1.7 MG/DL — HIGH (ref 0.7–1.5)
CREAT SERPL-MCNC: 1.8 MG/DL — HIGH (ref 0.7–1.5)
CULTURE RESULTS: SIGNIFICANT CHANGE UP
CULTURE RESULTS: SIGNIFICANT CHANGE UP
DIFF PNL FLD: ABNORMAL
EGFR: 34 ML/MIN/1.73M2 — LOW
EGFR: 37 ML/MIN/1.73M2 — LOW
EGFR: 40 ML/MIN/1.73M2 — LOW
EGFR: 40 ML/MIN/1.73M2 — LOW
EOSINOPHIL # BLD AUTO: 0 K/UL — SIGNIFICANT CHANGE UP (ref 0–0.7)
EOSINOPHIL # BLD AUTO: 0 K/UL — SIGNIFICANT CHANGE UP (ref 0–0.7)
EOSINOPHIL # BLD AUTO: 0.01 K/UL — SIGNIFICANT CHANGE UP (ref 0–0.7)
EOSINOPHIL NFR BLD AUTO: 0 % — SIGNIFICANT CHANGE UP (ref 0–8)
EOSINOPHIL NFR BLD AUTO: 0 % — SIGNIFICANT CHANGE UP (ref 0–8)
EOSINOPHIL NFR BLD AUTO: 0.1 % — SIGNIFICANT CHANGE UP (ref 0–8)
ESTIMATED AVERAGE GLUCOSE: 103 MG/DL — SIGNIFICANT CHANGE UP (ref 68–114)
GAS PNL BLDA: SIGNIFICANT CHANGE UP
GLUCOSE SERPL-MCNC: 103 MG/DL — HIGH (ref 70–99)
GLUCOSE SERPL-MCNC: 110 MG/DL — HIGH (ref 70–99)
GLUCOSE SERPL-MCNC: 113 MG/DL — HIGH (ref 70–99)
GLUCOSE SERPL-MCNC: 97 MG/DL — SIGNIFICANT CHANGE UP (ref 70–99)
GLUCOSE UR QL: NEGATIVE MG/DL — SIGNIFICANT CHANGE UP
HCO3 BLDA-SCNC: 22 MMOL/L — SIGNIFICANT CHANGE UP (ref 21–28)
HCT VFR BLD CALC: 31.8 % — LOW (ref 42–52)
HCT VFR BLD CALC: 32.6 % — LOW (ref 42–52)
HCT VFR BLD CALC: 33.7 % — LOW (ref 42–52)
HCT VFR BLD CALC: 34.1 % — LOW (ref 42–52)
HCT VFR BLD CALC: 34.6 % — LOW (ref 42–52)
HDLC SERPL-MCNC: 44 MG/DL — SIGNIFICANT CHANGE UP
HGB BLD-MCNC: 10.6 G/DL — LOW (ref 14–18)
HGB BLD-MCNC: 10.9 G/DL — LOW (ref 14–18)
HGB BLD-MCNC: 11 G/DL — LOW (ref 14–18)
HGB BLD-MCNC: 11.3 G/DL — LOW (ref 14–18)
HGB BLD-MCNC: 11.3 G/DL — LOW (ref 14–18)
HOROWITZ INDEX BLDA+IHG-RTO: 32 — SIGNIFICANT CHANGE UP
IMM GRANULOCYTES NFR BLD AUTO: 0.2 % — SIGNIFICANT CHANGE UP (ref 0.1–0.3)
IMM GRANULOCYTES NFR BLD AUTO: 0.4 % — HIGH (ref 0.1–0.3)
IMM GRANULOCYTES NFR BLD AUTO: 0.4 % — HIGH (ref 0.1–0.3)
INR BLD: 1.38 RATIO — HIGH (ref 0.65–1.3)
INR BLD: 1.42 RATIO — HIGH (ref 0.65–1.3)
KETONES UR-MCNC: NEGATIVE MG/DL — SIGNIFICANT CHANGE UP
LACTATE SERPL-SCNC: 1.9 MMOL/L — SIGNIFICANT CHANGE UP (ref 0.7–2)
LACTATE SERPL-SCNC: 2.6 MMOL/L — HIGH (ref 0.7–2)
LEUKOCYTE ESTERASE UR-ACNC: NEGATIVE — SIGNIFICANT CHANGE UP
LIDOCAIN IGE QN: 16 U/L — SIGNIFICANT CHANGE UP (ref 7–60)
LIPID PNL WITH DIRECT LDL SERPL: 50 MG/DL — SIGNIFICANT CHANGE UP
LYMPHOCYTES # BLD AUTO: 0.81 K/UL — LOW (ref 1.2–3.4)
LYMPHOCYTES # BLD AUTO: 0.94 K/UL — LOW (ref 1.2–3.4)
LYMPHOCYTES # BLD AUTO: 1.13 K/UL — LOW (ref 1.2–3.4)
LYMPHOCYTES # BLD AUTO: 10.5 % — LOW (ref 20.5–51.1)
LYMPHOCYTES # BLD AUTO: 15.9 % — LOW (ref 20.5–51.1)
LYMPHOCYTES # BLD AUTO: 8.3 % — LOW (ref 20.5–51.1)
MAGNESIUM SERPL-MCNC: 2 MG/DL — SIGNIFICANT CHANGE UP (ref 1.8–2.4)
MAGNESIUM SERPL-MCNC: 2.1 MG/DL — SIGNIFICANT CHANGE UP (ref 1.8–2.4)
MCHC RBC-ENTMCNC: 31.6 PG — HIGH (ref 27–31)
MCHC RBC-ENTMCNC: 31.7 PG — HIGH (ref 27–31)
MCHC RBC-ENTMCNC: 31.7 PG — HIGH (ref 27–31)
MCHC RBC-ENTMCNC: 31.9 PG — HIGH (ref 27–31)
MCHC RBC-ENTMCNC: 32 PG — HIGH (ref 27–31)
MCHC RBC-ENTMCNC: 32.6 G/DL — SIGNIFICANT CHANGE UP (ref 32–37)
MCHC RBC-ENTMCNC: 32.7 G/DL — SIGNIFICANT CHANGE UP (ref 32–37)
MCHC RBC-ENTMCNC: 33.1 G/DL — SIGNIFICANT CHANGE UP (ref 32–37)
MCHC RBC-ENTMCNC: 33.3 G/DL — SIGNIFICANT CHANGE UP (ref 32–37)
MCHC RBC-ENTMCNC: 33.4 G/DL — SIGNIFICANT CHANGE UP (ref 32–37)
MCV RBC AUTO: 95.2 FL — HIGH (ref 80–94)
MCV RBC AUTO: 95.3 FL — HIGH (ref 80–94)
MCV RBC AUTO: 96.6 FL — HIGH (ref 80–94)
MCV RBC AUTO: 96.8 FL — HIGH (ref 80–94)
MCV RBC AUTO: 97.2 FL — HIGH (ref 80–94)
MONOCYTES # BLD AUTO: 0.56 K/UL — SIGNIFICANT CHANGE UP (ref 0.1–0.6)
MONOCYTES # BLD AUTO: 0.65 K/UL — HIGH (ref 0.1–0.6)
MONOCYTES # BLD AUTO: 0.72 K/UL — HIGH (ref 0.1–0.6)
MONOCYTES NFR BLD AUTO: 7.3 % — SIGNIFICANT CHANGE UP (ref 1.7–9.3)
MONOCYTES NFR BLD AUTO: 7.4 % — SIGNIFICANT CHANGE UP (ref 1.7–9.3)
MONOCYTES NFR BLD AUTO: 7.9 % — SIGNIFICANT CHANGE UP (ref 1.7–9.3)
NEUTROPHILS # BLD AUTO: 5.35 K/UL — SIGNIFICANT CHANGE UP (ref 1.4–6.5)
NEUTROPHILS # BLD AUTO: 7.29 K/UL — HIGH (ref 1.4–6.5)
NEUTROPHILS # BLD AUTO: 8.14 K/UL — HIGH (ref 1.4–6.5)
NEUTROPHILS NFR BLD AUTO: 75.1 % — SIGNIFICANT CHANGE UP (ref 42.2–75.2)
NEUTROPHILS NFR BLD AUTO: 81.5 % — HIGH (ref 42.2–75.2)
NEUTROPHILS NFR BLD AUTO: 83.8 % — HIGH (ref 42.2–75.2)
NITRITE UR-MCNC: NEGATIVE — SIGNIFICANT CHANGE UP
NON HDL CHOLESTEROL: 63 MG/DL — SIGNIFICANT CHANGE UP
NRBC # BLD: 0 /100 WBCS — SIGNIFICANT CHANGE UP (ref 0–0)
PCO2 BLDA: 34 MMHG — LOW (ref 35–48)
PH BLDA: 7.42 — SIGNIFICANT CHANGE UP (ref 7.35–7.45)
PH UR: 6 — SIGNIFICANT CHANGE UP (ref 5–8)
PLATELET # BLD AUTO: 115 K/UL — LOW (ref 130–400)
PLATELET # BLD AUTO: 79 K/UL — LOW (ref 130–400)
PLATELET # BLD AUTO: 80 K/UL — LOW (ref 130–400)
PLATELET # BLD AUTO: 88 K/UL — LOW (ref 130–400)
PLATELET # BLD AUTO: 95 K/UL — LOW (ref 130–400)
PMV BLD: 13 FL — HIGH (ref 7.4–10.4)
PMV BLD: 13 FL — HIGH (ref 7.4–10.4)
PMV BLD: 13.3 FL — HIGH (ref 7.4–10.4)
PMV BLD: 13.4 FL — HIGH (ref 7.4–10.4)
PMV BLD: 13.8 FL — HIGH (ref 7.4–10.4)
PO2 BLDA: 70 MMHG — LOW (ref 83–108)
POTASSIUM SERPL-MCNC: 4.2 MMOL/L — SIGNIFICANT CHANGE UP (ref 3.5–5)
POTASSIUM SERPL-MCNC: 4.2 MMOL/L — SIGNIFICANT CHANGE UP (ref 3.5–5)
POTASSIUM SERPL-MCNC: 4.5 MMOL/L — SIGNIFICANT CHANGE UP (ref 3.5–5)
POTASSIUM SERPL-MCNC: 5.1 MMOL/L — HIGH (ref 3.5–5)
POTASSIUM SERPL-SCNC: 4.2 MMOL/L — SIGNIFICANT CHANGE UP (ref 3.5–5)
POTASSIUM SERPL-SCNC: 4.2 MMOL/L — SIGNIFICANT CHANGE UP (ref 3.5–5)
POTASSIUM SERPL-SCNC: 4.5 MMOL/L — SIGNIFICANT CHANGE UP (ref 3.5–5)
POTASSIUM SERPL-SCNC: 5.1 MMOL/L — HIGH (ref 3.5–5)
PROCALCITONIN SERPL-MCNC: 0.08 NG/ML — SIGNIFICANT CHANGE UP (ref 0.02–0.1)
PROT SERPL-MCNC: 6.1 G/DL — SIGNIFICANT CHANGE UP (ref 6–8)
PROT SERPL-MCNC: 6.5 G/DL — SIGNIFICANT CHANGE UP (ref 6–8)
PROT UR-MCNC: NEGATIVE MG/DL — SIGNIFICANT CHANGE UP
PROTHROM AB SERPL-ACNC: 15.8 SEC — HIGH (ref 9.95–12.87)
PROTHROM AB SERPL-ACNC: 16.3 SEC — HIGH (ref 9.95–12.87)
RBC # BLD: 3.34 M/UL — LOW (ref 4.7–6.1)
RBC # BLD: 3.42 M/UL — LOW (ref 4.7–6.1)
RBC # BLD: 3.48 M/UL — LOW (ref 4.7–6.1)
RBC # BLD: 3.53 M/UL — LOW (ref 4.7–6.1)
RBC # BLD: 3.56 M/UL — LOW (ref 4.7–6.1)
RBC # FLD: 12.9 % — SIGNIFICANT CHANGE UP (ref 11.5–14.5)
RBC # FLD: 13 % — SIGNIFICANT CHANGE UP (ref 11.5–14.5)
RBC # FLD: 13 % — SIGNIFICANT CHANGE UP (ref 11.5–14.5)
RBC # FLD: 13.1 % — SIGNIFICANT CHANGE UP (ref 11.5–14.5)
RBC # FLD: 13.2 % — SIGNIFICANT CHANGE UP (ref 11.5–14.5)
RBC CASTS # UR COMP ASSIST: 1 /HPF — SIGNIFICANT CHANGE UP (ref 0–4)
SAO2 % BLDA: 95.9 % — SIGNIFICANT CHANGE UP (ref 94–98)
SARS-COV-2 RNA SPEC QL NAA+PROBE: SIGNIFICANT CHANGE UP
SODIUM SERPL-SCNC: 135 MMOL/L — SIGNIFICANT CHANGE UP (ref 135–146)
SODIUM SERPL-SCNC: 140 MMOL/L — SIGNIFICANT CHANGE UP (ref 135–146)
SODIUM SERPL-SCNC: 141 MMOL/L — SIGNIFICANT CHANGE UP (ref 135–146)
SODIUM SERPL-SCNC: 141 MMOL/L — SIGNIFICANT CHANGE UP (ref 135–146)
SP GR SPEC: 1.01 — SIGNIFICANT CHANGE UP (ref 1–1.03)
SPECIMEN SOURCE: SIGNIFICANT CHANGE UP
SPECIMEN SOURCE: SIGNIFICANT CHANGE UP
SQUAMOUS # UR AUTO: 0 /HPF — SIGNIFICANT CHANGE UP (ref 0–5)
T4 FREE SERPL-MCNC: 1.2 NG/DL — SIGNIFICANT CHANGE UP (ref 0.9–1.8)
TRIGL SERPL-MCNC: 59 MG/DL — SIGNIFICANT CHANGE UP
TROPONIN T, HIGH SENSITIVITY RESULT: 1262 NG/L — CRITICAL HIGH (ref 6–21)
TROPONIN T, HIGH SENSITIVITY RESULT: 1280 NG/L — CRITICAL HIGH (ref 6–21)
TROPONIN T, HIGH SENSITIVITY RESULT: 1409 NG/L — CRITICAL HIGH (ref 6–21)
TROPONIN T, HIGH SENSITIVITY RESULT: 1509 NG/L — CRITICAL HIGH (ref 6–21)
TSH SERPL-MCNC: 0.95 UIU/ML — SIGNIFICANT CHANGE UP (ref 0.27–4.2)
TSH SERPL-MCNC: 1.17 UIU/ML — SIGNIFICANT CHANGE UP (ref 0.27–4.2)
UROBILINOGEN FLD QL: 1 MG/DL — SIGNIFICANT CHANGE UP (ref 0.2–1)
WBC # BLD: 6.1 K/UL — SIGNIFICANT CHANGE UP (ref 4.8–10.8)
WBC # BLD: 7.12 K/UL — SIGNIFICANT CHANGE UP (ref 4.8–10.8)
WBC # BLD: 8.04 K/UL — SIGNIFICANT CHANGE UP (ref 4.8–10.8)
WBC # BLD: 8.95 K/UL — SIGNIFICANT CHANGE UP (ref 4.8–10.8)
WBC # BLD: 9.72 K/UL — SIGNIFICANT CHANGE UP (ref 4.8–10.8)
WBC # FLD AUTO: 6.1 K/UL — SIGNIFICANT CHANGE UP (ref 4.8–10.8)
WBC # FLD AUTO: 7.12 K/UL — SIGNIFICANT CHANGE UP (ref 4.8–10.8)
WBC # FLD AUTO: 8.04 K/UL — SIGNIFICANT CHANGE UP (ref 4.8–10.8)
WBC # FLD AUTO: 8.95 K/UL — SIGNIFICANT CHANGE UP (ref 4.8–10.8)
WBC # FLD AUTO: 9.72 K/UL — SIGNIFICANT CHANGE UP (ref 4.8–10.8)
WBC UR QL: 0 /HPF — SIGNIFICANT CHANGE UP (ref 0–5)

## 2024-01-01 PROCEDURE — 81001 URINALYSIS AUTO W/SCOPE: CPT

## 2024-01-01 PROCEDURE — 84145 PROCALCITONIN (PCT): CPT

## 2024-01-01 PROCEDURE — 99233 SBSQ HOSP IP/OBS HIGH 50: CPT

## 2024-01-01 PROCEDURE — 36415 COLL VENOUS BLD VENIPUNCTURE: CPT

## 2024-01-01 PROCEDURE — 71045 X-RAY EXAM CHEST 1 VIEW: CPT | Mod: 26

## 2024-01-01 PROCEDURE — 99232 SBSQ HOSP IP/OBS MODERATE 35: CPT

## 2024-01-01 PROCEDURE — 83036 HEMOGLOBIN GLYCOSYLATED A1C: CPT

## 2024-01-01 PROCEDURE — 87635 SARS-COV-2 COVID-19 AMP PRB: CPT

## 2024-01-01 PROCEDURE — 93010 ELECTROCARDIOGRAM REPORT: CPT

## 2024-01-01 PROCEDURE — 85027 COMPLETE CBC AUTOMATED: CPT

## 2024-01-01 PROCEDURE — 72170 X-RAY EXAM OF PELVIS: CPT | Mod: 26

## 2024-01-01 PROCEDURE — 71045 X-RAY EXAM CHEST 1 VIEW: CPT

## 2024-01-01 PROCEDURE — 92610 EVALUATE SWALLOWING FUNCTION: CPT | Mod: GN

## 2024-01-01 PROCEDURE — 80048 BASIC METABOLIC PNL TOTAL CA: CPT

## 2024-01-01 PROCEDURE — 85610 PROTHROMBIN TIME: CPT

## 2024-01-01 PROCEDURE — 99223 1ST HOSP IP/OBS HIGH 75: CPT

## 2024-01-01 PROCEDURE — 85025 COMPLETE CBC W/AUTO DIFF WBC: CPT

## 2024-01-01 PROCEDURE — 84439 ASSAY OF FREE THYROXINE: CPT

## 2024-01-01 PROCEDURE — 80061 LIPID PANEL: CPT

## 2024-01-01 PROCEDURE — 93306 TTE W/DOPPLER COMPLETE: CPT

## 2024-01-01 PROCEDURE — 70450 CT HEAD/BRAIN W/O DYE: CPT

## 2024-01-01 PROCEDURE — 93306 TTE W/DOPPLER COMPLETE: CPT | Mod: 26

## 2024-01-01 PROCEDURE — 85730 THROMBOPLASTIN TIME PARTIAL: CPT

## 2024-01-01 PROCEDURE — 99285 EMERGENCY DEPT VISIT HI MDM: CPT | Mod: FS

## 2024-01-01 PROCEDURE — 83605 ASSAY OF LACTIC ACID: CPT

## 2024-01-01 PROCEDURE — 87040 BLOOD CULTURE FOR BACTERIA: CPT

## 2024-01-01 PROCEDURE — 92526 ORAL FUNCTION THERAPY: CPT | Mod: GN

## 2024-01-01 PROCEDURE — 99284 EMERGENCY DEPT VISIT MOD MDM: CPT | Mod: GC

## 2024-01-01 PROCEDURE — 82803 BLOOD GASES ANY COMBINATION: CPT

## 2024-01-01 PROCEDURE — 83735 ASSAY OF MAGNESIUM: CPT

## 2024-01-01 PROCEDURE — 84443 ASSAY THYROID STIM HORMONE: CPT

## 2024-01-01 PROCEDURE — C9113: CPT

## 2024-01-01 PROCEDURE — 84484 ASSAY OF TROPONIN QUANT: CPT

## 2024-01-01 PROCEDURE — 93005 ELECTROCARDIOGRAM TRACING: CPT

## 2024-01-01 PROCEDURE — 99239 HOSP IP/OBS DSCHRG MGMT >30: CPT

## 2024-01-01 PROCEDURE — 99233 SBSQ HOSP IP/OBS HIGH 50: CPT | Mod: GC

## 2024-01-01 PROCEDURE — 80053 COMPREHEN METABOLIC PANEL: CPT

## 2024-01-01 PROCEDURE — 95819 EEG AWAKE AND ASLEEP: CPT

## 2024-01-01 PROCEDURE — 99221 1ST HOSP IP/OBS SF/LOW 40: CPT

## 2024-01-01 PROCEDURE — 70450 CT HEAD/BRAIN W/O DYE: CPT | Mod: 26

## 2024-01-01 PROCEDURE — 70450 CT HEAD/BRAIN W/O DYE: CPT | Mod: 26,77

## 2024-01-01 RX ORDER — ASPIRIN/CALCIUM CARB/MAGNESIUM 324 MG
1 TABLET ORAL
Refills: 0 | DISCHARGE

## 2024-01-01 RX ORDER — AZITHROMYCIN 500 MG/1
500 TABLET, FILM COATED ORAL EVERY 24 HOURS
Refills: 0 | Status: DISCONTINUED | OUTPATIENT
Start: 2024-01-01 | End: 2024-01-01

## 2024-01-01 RX ORDER — SCOPALAMINE 1 MG/3D
1 PATCH, EXTENDED RELEASE TRANSDERMAL ONCE
Refills: 0 | Status: COMPLETED | OUTPATIENT
Start: 2024-01-01 | End: 2024-01-01

## 2024-01-01 RX ORDER — CLOPIDOGREL BISULFATE 75 MG/1
1 TABLET, FILM COATED ORAL
Refills: 0 | DISCHARGE

## 2024-01-01 RX ORDER — ASPIRIN/CALCIUM CARB/MAGNESIUM 324 MG
81 TABLET ORAL DAILY
Refills: 0 | Status: DISCONTINUED | OUTPATIENT
Start: 2024-01-01 | End: 2024-01-01

## 2024-01-01 RX ORDER — METHOCARBAMOL 500 MG/1
2 TABLET, FILM COATED ORAL
Qty: 0 | Refills: 0 | DISCHARGE

## 2024-01-01 RX ORDER — HYDROMORPHONE HYDROCHLORIDE 2 MG/ML
0.2 INJECTION INTRAMUSCULAR; INTRAVENOUS; SUBCUTANEOUS
Refills: 0 | Status: DISCONTINUED | OUTPATIENT
Start: 2024-01-01 | End: 2024-01-01

## 2024-01-01 RX ORDER — AZITHROMYCIN 500 MG/1
TABLET, FILM COATED ORAL
Refills: 0 | Status: DISCONTINUED | OUTPATIENT
Start: 2024-01-01 | End: 2024-01-01

## 2024-01-01 RX ORDER — AZITHROMYCIN 500 MG/1
500 TABLET, FILM COATED ORAL ONCE
Refills: 0 | Status: COMPLETED | OUTPATIENT
Start: 2024-01-01 | End: 2024-01-01

## 2024-01-01 RX ORDER — ATORVASTATIN CALCIUM 80 MG/1
40 TABLET, FILM COATED ORAL AT BEDTIME
Refills: 0 | Status: DISCONTINUED | OUTPATIENT
Start: 2024-01-01 | End: 2024-01-01

## 2024-01-01 RX ORDER — CLONAZEPAM 1 MG
0 TABLET ORAL
Qty: 0 | Refills: 0 | DISCHARGE

## 2024-01-01 RX ORDER — ATORVASTATIN CALCIUM 80 MG/1
0 TABLET, FILM COATED ORAL
Qty: 0 | Refills: 0 | DISCHARGE

## 2024-01-01 RX ORDER — PANTOPRAZOLE SODIUM 20 MG/1
40 TABLET, DELAYED RELEASE ORAL
Refills: 0 | Status: DISCONTINUED | OUTPATIENT
Start: 2024-01-01 | End: 2024-01-01

## 2024-01-01 RX ORDER — AMPICILLIN SODIUM AND SULBACTAM SODIUM 250; 125 MG/ML; MG/ML
3 INJECTION, POWDER, FOR SUSPENSION INTRAMUSCULAR; INTRAVENOUS ONCE
Refills: 0 | Status: COMPLETED | OUTPATIENT
Start: 2024-01-01 | End: 2024-01-01

## 2024-01-01 RX ORDER — CHLORHEXIDINE GLUCONATE 213 G/1000ML
1 SOLUTION TOPICAL DAILY
Refills: 0 | Status: DISCONTINUED | OUTPATIENT
Start: 2024-01-01 | End: 2024-01-01

## 2024-01-01 RX ORDER — AMPICILLIN SODIUM AND SULBACTAM SODIUM 250; 125 MG/ML; MG/ML
3 INJECTION, POWDER, FOR SUSPENSION INTRAMUSCULAR; INTRAVENOUS EVERY 6 HOURS
Refills: 0 | Status: DISCONTINUED | OUTPATIENT
Start: 2024-01-01 | End: 2024-01-01

## 2024-01-01 RX ORDER — HEPARIN SODIUM 5000 [USP'U]/ML
INJECTION INTRAVENOUS; SUBCUTANEOUS
Qty: 25000 | Refills: 0 | Status: DISCONTINUED | OUTPATIENT
Start: 2024-01-01 | End: 2024-01-01

## 2024-01-01 RX ORDER — OMEPRAZOLE 10 MG/1
1 CAPSULE, DELAYED RELEASE ORAL
Refills: 0 | DISCHARGE

## 2024-01-01 RX ORDER — PANTOPRAZOLE SODIUM 20 MG/1
40 TABLET, DELAYED RELEASE ORAL DAILY
Refills: 0 | Status: DISCONTINUED | OUTPATIENT
Start: 2024-01-01 | End: 2024-01-01

## 2024-01-01 RX ORDER — CLOPIDOGREL BISULFATE 75 MG/1
75 TABLET, FILM COATED ORAL DAILY
Refills: 0 | Status: DISCONTINUED | OUTPATIENT
Start: 2024-01-01 | End: 2024-01-01

## 2024-01-01 RX ORDER — TAMSULOSIN HYDROCHLORIDE 0.4 MG/1
1 CAPSULE ORAL
Qty: 0 | Refills: 0 | DISCHARGE

## 2024-01-01 RX ORDER — METOPROLOL TARTRATE 50 MG
0 TABLET ORAL
Qty: 0 | Refills: 0 | DISCHARGE

## 2024-01-01 RX ORDER — CLOPIDOGREL BISULFATE 75 MG/1
300 TABLET, FILM COATED ORAL ONCE
Refills: 0 | Status: COMPLETED | OUTPATIENT
Start: 2024-01-01 | End: 2024-01-01

## 2024-01-01 RX ORDER — AMPICILLIN SODIUM AND SULBACTAM SODIUM 250; 125 MG/ML; MG/ML
INJECTION, POWDER, FOR SUSPENSION INTRAMUSCULAR; INTRAVENOUS
Refills: 0 | Status: DISCONTINUED | OUTPATIENT
Start: 2024-01-01 | End: 2024-01-01

## 2024-01-01 RX ORDER — ASPIRIN/CALCIUM CARB/MAGNESIUM 324 MG
324 TABLET ORAL ONCE
Refills: 0 | Status: COMPLETED | OUTPATIENT
Start: 2024-01-01 | End: 2024-01-01

## 2024-01-01 RX ORDER — METOPROLOL TARTRATE 50 MG
1 TABLET ORAL
Refills: 0 | DISCHARGE

## 2024-01-01 RX ORDER — HEPARIN SODIUM 5000 [USP'U]/ML
4000 INJECTION INTRAVENOUS; SUBCUTANEOUS ONCE
Refills: 0 | Status: COMPLETED | OUTPATIENT
Start: 2024-01-01 | End: 2024-01-01

## 2024-01-01 RX ORDER — BUDESONIDE AND FORMOTEROL FUMARATE DIHYDRATE 160; 4.5 UG/1; UG/1
2 AEROSOL RESPIRATORY (INHALATION)
Qty: 0 | Refills: 0 | DISCHARGE

## 2024-01-01 RX ORDER — METOPROLOL TARTRATE 50 MG
25 TABLET ORAL DAILY
Refills: 0 | Status: DISCONTINUED | OUTPATIENT
Start: 2024-01-01 | End: 2024-01-01

## 2024-01-01 RX ORDER — ATORVASTATIN CALCIUM 80 MG/1
1 TABLET, FILM COATED ORAL
Refills: 0 | DISCHARGE

## 2024-01-01 RX ORDER — DEXMEDETOMIDINE HYDROCHLORIDE IN 0.9% SODIUM CHLORIDE 4 UG/ML
23 INJECTION INTRAVENOUS ONCE
Refills: 0 | Status: DISCONTINUED | OUTPATIENT
Start: 2024-01-01 | End: 2024-01-01

## 2024-01-01 RX ORDER — SCOPALAMINE 1 MG/3D
1 PATCH, EXTENDED RELEASE TRANSDERMAL
Refills: 0 | Status: DISCONTINUED | OUTPATIENT
Start: 2024-01-01 | End: 2024-01-01

## 2024-01-01 RX ORDER — CLONAZEPAM 1 MG
1 TABLET ORAL
Refills: 0 | DISCHARGE

## 2024-01-01 RX ORDER — CLOPIDOGREL BISULFATE 75 MG/1
1 TABLET, FILM COATED ORAL
Qty: 0 | Refills: 0 | DISCHARGE

## 2024-01-01 RX ORDER — ATORVASTATIN CALCIUM 80 MG/1
10 TABLET, FILM COATED ORAL AT BEDTIME
Refills: 0 | Status: DISCONTINUED | OUTPATIENT
Start: 2024-01-01 | End: 2024-01-01

## 2024-01-01 RX ADMIN — AZITHROMYCIN 255 MILLIGRAM(S): 500 TABLET, FILM COATED ORAL at 13:29

## 2024-01-01 RX ADMIN — AMPICILLIN SODIUM AND SULBACTAM SODIUM 200 GRAM(S): 250; 125 INJECTION, POWDER, FOR SUSPENSION INTRAMUSCULAR; INTRAVENOUS at 11:05

## 2024-01-01 RX ADMIN — AMPICILLIN SODIUM AND SULBACTAM SODIUM 200 GRAM(S): 250; 125 INJECTION, POWDER, FOR SUSPENSION INTRAMUSCULAR; INTRAVENOUS at 05:46

## 2024-01-01 RX ADMIN — PANTOPRAZOLE SODIUM 40 MILLIGRAM(S): 20 TABLET, DELAYED RELEASE ORAL at 11:55

## 2024-01-01 RX ADMIN — PANTOPRAZOLE SODIUM 40 MILLIGRAM(S): 20 TABLET, DELAYED RELEASE ORAL at 11:18

## 2024-01-01 RX ADMIN — HYDROMORPHONE HYDROCHLORIDE 0.2 MILLIGRAM(S): 2 INJECTION INTRAMUSCULAR; INTRAVENOUS; SUBCUTANEOUS at 18:10

## 2024-01-01 RX ADMIN — AMPICILLIN SODIUM AND SULBACTAM SODIUM 200 GRAM(S): 250; 125 INJECTION, POWDER, FOR SUSPENSION INTRAMUSCULAR; INTRAVENOUS at 17:46

## 2024-01-01 RX ADMIN — CHLORHEXIDINE GLUCONATE 1 APPLICATION(S): 213 SOLUTION TOPICAL at 11:08

## 2024-01-01 RX ADMIN — HYDROMORPHONE HYDROCHLORIDE 0.2 MILLIGRAM(S): 2 INJECTION INTRAMUSCULAR; INTRAVENOUS; SUBCUTANEOUS at 17:49

## 2024-01-01 RX ADMIN — Medication 0.5 MILLIGRAM(S): at 07:44

## 2024-01-01 RX ADMIN — Medication 0.5 MILLIGRAM(S): at 17:50

## 2024-01-01 RX ADMIN — Medication 0.5 MILLIGRAM(S): at 13:28

## 2024-01-01 RX ADMIN — ATORVASTATIN CALCIUM 40 MILLIGRAM(S): 80 TABLET, FILM COATED ORAL at 21:24

## 2024-01-01 RX ADMIN — HYDROMORPHONE HYDROCHLORIDE 0.2 MILLIGRAM(S): 2 INJECTION INTRAMUSCULAR; INTRAVENOUS; SUBCUTANEOUS at 21:51

## 2024-01-01 RX ADMIN — AMPICILLIN SODIUM AND SULBACTAM SODIUM 200 GRAM(S): 250; 125 INJECTION, POWDER, FOR SUSPENSION INTRAMUSCULAR; INTRAVENOUS at 11:23

## 2024-01-01 RX ADMIN — AZITHROMYCIN 255 MILLIGRAM(S): 500 TABLET, FILM COATED ORAL at 11:23

## 2024-01-01 RX ADMIN — AZITHROMYCIN 255 MILLIGRAM(S): 500 TABLET, FILM COATED ORAL at 11:18

## 2024-01-01 RX ADMIN — CHLORHEXIDINE GLUCONATE 1 APPLICATION(S): 213 SOLUTION TOPICAL at 11:55

## 2024-01-01 RX ADMIN — AMPICILLIN SODIUM AND SULBACTAM SODIUM 200 GRAM(S): 250; 125 INJECTION, POWDER, FOR SUSPENSION INTRAMUSCULAR; INTRAVENOUS at 12:51

## 2024-01-01 RX ADMIN — HEPARIN SODIUM 4000 UNIT(S): 5000 INJECTION INTRAVENOUS; SUBCUTANEOUS at 19:45

## 2024-01-01 RX ADMIN — HYDROMORPHONE HYDROCHLORIDE 0.2 MILLIGRAM(S): 2 INJECTION INTRAMUSCULAR; INTRAVENOUS; SUBCUTANEOUS at 14:00

## 2024-01-01 RX ADMIN — HEPARIN SODIUM 700 UNIT(S)/HR: 5000 INJECTION INTRAVENOUS; SUBCUTANEOUS at 04:05

## 2024-01-01 RX ADMIN — HYDROMORPHONE HYDROCHLORIDE 0.2 MILLIGRAM(S): 2 INJECTION INTRAMUSCULAR; INTRAVENOUS; SUBCUTANEOUS at 00:00

## 2024-01-01 RX ADMIN — SCOPALAMINE 1 PATCH: 1 PATCH, EXTENDED RELEASE TRANSDERMAL at 09:40

## 2024-01-01 RX ADMIN — CHLORHEXIDINE GLUCONATE 1 APPLICATION(S): 213 SOLUTION TOPICAL at 13:23

## 2024-01-01 RX ADMIN — Medication 0.5 MILLIGRAM(S): at 13:18

## 2024-01-01 RX ADMIN — AMPICILLIN SODIUM AND SULBACTAM SODIUM 200 GRAM(S): 250; 125 INJECTION, POWDER, FOR SUSPENSION INTRAMUSCULAR; INTRAVENOUS at 23:41

## 2024-01-01 RX ADMIN — CHLORHEXIDINE GLUCONATE 1 APPLICATION(S): 213 SOLUTION TOPICAL at 11:23

## 2024-01-01 RX ADMIN — HYDROMORPHONE HYDROCHLORIDE 0.2 MILLIGRAM(S): 2 INJECTION INTRAMUSCULAR; INTRAVENOUS; SUBCUTANEOUS at 05:10

## 2024-01-01 RX ADMIN — AMPICILLIN SODIUM AND SULBACTAM SODIUM 200 GRAM(S): 250; 125 INJECTION, POWDER, FOR SUSPENSION INTRAMUSCULAR; INTRAVENOUS at 11:18

## 2024-01-01 RX ADMIN — HEPARIN SODIUM 950 UNIT(S)/HR: 5000 INJECTION INTRAVENOUS; SUBCUTANEOUS at 19:46

## 2024-01-01 RX ADMIN — HYDROMORPHONE HYDROCHLORIDE 0.2 MILLIGRAM(S): 2 INJECTION INTRAMUSCULAR; INTRAVENOUS; SUBCUTANEOUS at 13:28

## 2024-01-01 RX ADMIN — AMPICILLIN SODIUM AND SULBACTAM SODIUM 200 GRAM(S): 250; 125 INJECTION, POWDER, FOR SUSPENSION INTRAMUSCULAR; INTRAVENOUS at 06:38

## 2024-01-01 RX ADMIN — PANTOPRAZOLE SODIUM 40 MILLIGRAM(S): 20 TABLET, DELAYED RELEASE ORAL at 11:08

## 2024-01-01 RX ADMIN — AMPICILLIN SODIUM AND SULBACTAM SODIUM 200 GRAM(S): 250; 125 INJECTION, POWDER, FOR SUSPENSION INTRAMUSCULAR; INTRAVENOUS at 17:44

## 2024-01-01 RX ADMIN — HYDROMORPHONE HYDROCHLORIDE 0.2 MILLIGRAM(S): 2 INJECTION INTRAMUSCULAR; INTRAVENOUS; SUBCUTANEOUS at 13:17

## 2024-01-01 RX ADMIN — AMPICILLIN SODIUM AND SULBACTAM SODIUM 200 GRAM(S): 250; 125 INJECTION, POWDER, FOR SUSPENSION INTRAMUSCULAR; INTRAVENOUS at 00:00

## 2024-01-01 RX ADMIN — Medication 0.5 MILLIGRAM(S): at 21:51

## 2024-01-01 RX ADMIN — SCOPALAMINE 1 PATCH: 1 PATCH, EXTENDED RELEASE TRANSDERMAL at 19:40

## 2024-01-01 RX ADMIN — HYDROMORPHONE HYDROCHLORIDE 0.2 MILLIGRAM(S): 2 INJECTION INTRAMUSCULAR; INTRAVENOUS; SUBCUTANEOUS at 09:24

## 2024-01-01 RX ADMIN — HYDROMORPHONE HYDROCHLORIDE 0.2 MILLIGRAM(S): 2 INJECTION INTRAMUSCULAR; INTRAVENOUS; SUBCUTANEOUS at 08:00

## 2024-01-01 RX ADMIN — CLOPIDOGREL BISULFATE 300 MILLIGRAM(S): 75 TABLET, FILM COATED ORAL at 19:41

## 2024-01-01 RX ADMIN — HYDROMORPHONE HYDROCHLORIDE 0.2 MILLIGRAM(S): 2 INJECTION INTRAMUSCULAR; INTRAVENOUS; SUBCUTANEOUS at 06:13

## 2024-01-01 RX ADMIN — HYDROMORPHONE HYDROCHLORIDE 0.2 MILLIGRAM(S): 2 INJECTION INTRAMUSCULAR; INTRAVENOUS; SUBCUTANEOUS at 07:44

## 2024-01-01 RX ADMIN — AMPICILLIN SODIUM AND SULBACTAM SODIUM 200 GRAM(S): 250; 125 INJECTION, POWDER, FOR SUSPENSION INTRAMUSCULAR; INTRAVENOUS at 17:37

## 2024-01-01 RX ADMIN — HYDROMORPHONE HYDROCHLORIDE 0.2 MILLIGRAM(S): 2 INJECTION INTRAMUSCULAR; INTRAVENOUS; SUBCUTANEOUS at 21:19

## 2024-01-01 RX ADMIN — AMPICILLIN SODIUM AND SULBACTAM SODIUM 200 GRAM(S): 250; 125 INJECTION, POWDER, FOR SUSPENSION INTRAMUSCULAR; INTRAVENOUS at 23:28

## 2024-01-01 RX ADMIN — PANTOPRAZOLE SODIUM 40 MILLIGRAM(S): 20 TABLET, DELAYED RELEASE ORAL at 11:06

## 2024-01-01 RX ADMIN — HYDROMORPHONE HYDROCHLORIDE 0.2 MILLIGRAM(S): 2 INJECTION INTRAMUSCULAR; INTRAVENOUS; SUBCUTANEOUS at 14:11

## 2024-01-01 RX ADMIN — AMPICILLIN SODIUM AND SULBACTAM SODIUM 200 GRAM(S): 250; 125 INJECTION, POWDER, FOR SUSPENSION INTRAMUSCULAR; INTRAVENOUS at 05:29

## 2024-01-01 RX ADMIN — Medication 324 MILLIGRAM(S): at 19:41

## 2024-01-01 RX ADMIN — Medication 2 MILLIGRAM(S): at 14:57

## 2024-01-01 RX ADMIN — HYDROMORPHONE HYDROCHLORIDE 0.2 MILLIGRAM(S): 2 INJECTION INTRAMUSCULAR; INTRAVENOUS; SUBCUTANEOUS at 21:35

## 2024-01-01 RX ADMIN — PANTOPRAZOLE SODIUM 40 MILLIGRAM(S): 20 TABLET, DELAYED RELEASE ORAL at 11:23

## 2024-01-01 RX ADMIN — AZITHROMYCIN 255 MILLIGRAM(S): 500 TABLET, FILM COATED ORAL at 13:53

## 2024-01-01 RX ADMIN — HEPARIN SODIUM 0 UNIT(S)/HR: 5000 INJECTION INTRAVENOUS; SUBCUTANEOUS at 03:03

## 2024-02-09 NOTE — ED PROVIDER NOTE - CLINICAL SUMMARY MEDICAL DECISION MAKING FREE TEXT BOX
89-year-old male history of hypertension dyslipidemia CAD PCI today for evaluation of several months of intermittent chest discomfort.  Recurred again this morning.  No shortness of breath, lower extremity pain or swelling.  He is also requesting assistance with setting up home health aide.  Well appearing, NAD, non toxic. NCAT PERRLA EOMI neck supple non tender normal wob cta bl rrr abdomen s nt nd no rebound no guarding WWPx4 neuro non focal.  Labs EKG imaging reviewed.  Discussed with cardiology, will start heparin drip.  Will admit for further evaluation

## 2024-02-09 NOTE — H&P ADULT - TIME BILLING
Examined patient. Reviewed hospital course, ECG,  CXR, tele, lab work, and medications. Discussed plan with friend. Coordination of care.

## 2024-02-09 NOTE — ED PROVIDER NOTE - OBJECTIVE STATEMENT
89 yo male with a pmh of htn, hld, cad s/p stents presents c/o intermittent chest pain for over a month. 89 yo male with a pmh of htn, hld, cad s/p stents presents c/o intermittent chest pain for over a month. pt states the pain starts in the epigastric region and radiates to his R chest. pt denies any notes aggravating factors and notes relief when he takes tums. pt denies any other symptoms including fevers, chill, headache, recent illness/travel, cough, abdominal pain, or SOB.

## 2024-02-09 NOTE — H&P ADULT - HISTORY OF PRESENT ILLNESS
89 year old male, poor historian, former smoker, with PMHx of CAD with RCA stents, HTN, HLD, GERD, [place] who was BIBEMS for worsening non-radiating chest pain that started yesterday. Pt describes pain as sharp, midsternal, lasting for a few minutes at a time and self-resolves. Pt reports having intermittent chest pain for approximately 1 month but the past 2 days it has worsened, rating 8/10. He follows Dr. Martinez at [facility]. Pt currently chest pain free. Denies SOB, fevers/chills, N/V, Denies recent travel and recent illness.     In the ED: VS 98.3F, HR 75, /61, O2 sat 95% on room air. 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 Labs were significant for cr of 1.8 (baseline cr 1.0-1.1 in 10/2023), troponin 1409. CXR neg for cardiopulmonary disease. Pt received  mg x1, Plavix 300 mg x1, and was started on Heparin drip.     Of note, attempted to call Karen mensah) multiple times for collateral information however, she did not respond.

## 2024-02-09 NOTE — PATIENT PROFILE ADULT - FALL HARM RISK - HARM RISK INTERVENTIONS
Assistance with ambulation/Assistance OOB with selected safe patient handling equipment/Communicate Risk of Fall with Harm to all staff/Discuss with provider need for PT consult/Monitor for mental status changes/Monitor gait and stability/Provide patient with walking aids - walker, cane, crutches/Reinforce activity limits and safety measures with patient and family/Tailored Fall Risk Interventions/Toileting schedule using arm’s reach rule for commode and bathroom/Use of alarms - bed, chair and/or voice tab/Visual Cue: Yellow wristband and red socks/Bed in lowest position, wheels locked, appropriate side rails in place/Call bell, personal items and telephone in reach/Instruct patient to call for assistance before getting out of bed or chair/Non-slip footwear when patient is out of bed/[place] to call system/Physically safe environment - no spills, clutter or unnecessary equipment/Purposeful Proactive Rounding/Room/bathroom lighting operational, light cord in reach

## 2024-02-09 NOTE — ED ADULT TRIAGE NOTE - SOURCE OF INFORMATION
07/07/19 1251   Clinical Encounter Type   Visited With Patient and family together  (Two adult daughters at Bedside)   Routine Visit Introduction   Continue Visiting No   Patient's Supportive Strategies/Resources Omid provided emotional support, in Patient

## 2024-02-09 NOTE — H&P ADULT - NSHPLABSRESULTS_GEN_ALL_CORE
- Telemetry: SR w/ 1st degree AVB HR 70s   - ECG (2/9/24): SR with 1st degree AVB HR 73, LAFB w/ PAC  - CXR (2/9/24): negative for cardiopulmonary disease    - Radiology:    - Labs:                        11.3   7.12  )-----------( 115      ( 09 Feb 2024 14:43 )             34.6     02-09    141  |  105  |  32<H>  ----------------------------<  97  5.1<H>   |  22  |  1.8<H>    Ca    9.1      09 Feb 2024 14:43    TPro  6.5  /  Alb  4.0  /  TBili  1.6<H>  /  DBili  x   /  AST  44<H>  /  ALT  23  /  AlkPhos  66  02-09    LIVER FUNCTIONS - ( 09 Feb 2024 14:43 )  Alb: 4.0 g/dL / Pro: 6.5 g/dL / ALK PHOS: 66 U/L / ALT: 23 U/L / AST: 44 U/L / GGT: x             Lactate Trend    Urinalysis Basic - ( 09 Feb 2024 14:43 )    Color: x / Appearance: x / SG: x / pH: x  Gluc: 97 mg/dL / Ketone: x  / Bili: x / Urobili: x   Blood: x / Protein: x / Nitrite: x   Leuk Esterase: x / RBC: x / WBC x   Sq Epi: x / Non Sq Epi: x / Bacteria: x

## 2024-02-09 NOTE — H&P ADULT - ASSESSMENT
Assessment:  89 year old male, poor historian, former smoker, with PMHx of CAD with RCA stents, HTN, HLD, GERD, [place] who was BIBEMS for worsening non-radiating chest pain that started yesterday. Pt admitted to cardiac telemetry for NSTEMI.     Problems discussed and associated plan:    #NSTEMI  - Continue on heparin gtt for ACS  - Trend troponin   - Continue on DAPT: ASA/Plavix  - Continue on Toprol XL 25 mg qd  - Increased atorvastatin 10 mg qd increased to 40mg   - f/u TSH, FT4, HBA1C, lipid profile in AM  - Obtain TTE   - medically manage at this time   - Monitor on telemetry    #GERD  - on protonix     #HLD  - Increased atorvastatin to 40 mg (from 10 mg) qhs   - f/u lipid profile in AM     Call pharmacy [facility] to confirm med rec.      FULL CODE  Please contact me with any questions or concerns at [phone]. Assessment:  89 year old male, poor historian, former smoker, with PMHx of CAD with RCA stents, HTN, HLD, GERD, [place] who was BIBEMS for worsening non-radiating chest pain that started yesterday. Pt admitted to cardiac telemetry for NSTEMI.     Problems discussed and associated plan:    #NSTEMI  - Continue on heparin gtt for ACS  - Trend troponin   - Continue on DAPT: ASA/Plavix  - Continue on Toprol XL 25 mg qd  - Increased atorvastatin 10 mg qd increased to 40mg   - f/u TSH, FT4, HBA1C, lipid profile in AM  - Obtain TTE   - medically manage at this time   - Monitor on telemetry    #HAYLEE  - Cr 1.8 on admission (baseline Cr 1.0-1.1 in Oct 2023)  - Monitor BMP to assess cr     #GERD  - on protonix     #HLD  - Increased atorvastatin to 40 mg (from 10 mg) qhs   - f/u lipid profile in AM     Call pharmacy [facility] to confirm med rec.      FULL CODE  Please contact me with any questions or concerns at [phone].

## 2024-02-09 NOTE — H&P ADULT - NSHPPHYSICALEXAM_GEN_ALL_CORE
VITALS:   T(C): 36.4 (02-09-24 @ 18:10), Max: 36.8 (02-09-24 @ 12:12)  HR: 70 (02-09-24 @ 18:10) (70 - 75)  BP: 118/68 (02-09-24 @ 18:10) (118/68 - 139/61)  RR: 16 (02-09-24 @ 12:12) (16 - 16)  SpO2: 100% (02-09-24 @ 18:10) (95% - 100%)    GENERAL: NAD, lying in bed comfortably  HEAD:  Atraumatic, normocephalic  EYES: EOMI, PERRLA, conjunctiva and sclera clear  ENT: Moist mucous membranes  NECK: Supple, no JVD  HEART: Regular rate and rhythm, no murmurs, rubs, or gallops  LUNGS: Unlabored respirations.  Clear to auscultation bilaterally, no crackles, wheezing, or rhonchi  ABDOMEN: Soft, nontender, nondistended, +BS  EXTREMITIES: 2+ peripheral pulses bilaterally. No clubbing, cyanosis, or edema  NERVOUS SYSTEM:  A&Ox3, no focal deficits   SKIN: No rashes or lesions

## 2024-02-09 NOTE — H&P ADULT - NS ATTEND AMEND GEN_ALL_CORE FT
Agree with assessment and plan above, unless otherwise documented in my attestation.    Mr Quarles is a 89yoM with PMhx of CAD s/p RCA PCI, HTN, HLD who was brought to ED for chest pain and a fall. The history of a fall was revealed today after discussion with a friend and family. He was initially treated for ACS, but was altered today. Given his recent fall, CT head was ordered, which demonstrated artifact and possible SDH.   -Consulted neurology and NSGY  -Stop ACS treatment  -TTE  -Have initiated GOC discussion with family. Patient is now DNR/DNI. Agree with assessment and plan above, unless otherwise documented in my attestation.    Mr Quarles is a 89yoM with PMhx of CAD s/p RCA PCI, HTN, HLD who was brought to ED for chest pain and a fall. The history of a fall was revealed today after discussion with a friend and family. He was initially treated for ACS, but was altered today. Given his recent fall, CT head was ordered, which demonstrated artifact and possible SDH. Suspect that troponin is demand ischemia related to CVA vs delirium. Low suspicion for ACS at this time.   -Consulted neurology and NSGY  -Stop ACS treatment  -TTE  -Have initiated GOC discussion with family. Patient is now DNR/DNI.

## 2024-02-10 NOTE — CONSULT NOTE ADULT - ASSESSMENT
89 year old male, poor historian, former smoker, with PMHx of CAD with RCA stents, HTN, HLD, GERD, who was BIBEMS for worsening non-radiating chest pain and fall admitted for NSTEMI was put on Heparin drip and noticed he is more confused with some dysarthria and possible L sided weakness. CTH showed possible small R SDH. LKW - unknown. Neurologically - mostly encephalopathic with dysarthria in the settings of hypoxia, gurgling w possible L hemianopsia/neglect. Need to r/o Acute CVA.     Recommendations:     - repeat CTH noncon about 5-6 pm  - please obtain CTA head and neck if no contraindications  - MRI brain w/o when is possible  - held Heparin for now  - ASA per cardio team    Neuroendovascular attending attestation (Dr. Dalal)

## 2024-02-10 NOTE — CONSULT NOTE ADULT - ASSESSMENT
Plan    - Patient would not need NCC admission at this time  - Repeat CTH 3 hours from the prior scan, if sedation is needed to limit streak artifact, use precedex 0.3 mcg when going for scan  - Work up for possible metabolic encephalitis  - f/u Neurology recommendation  - Correct metabolic derangement  - Can reconsult NCC as needed.    Case discussed with NCC attending Dr. Rodriguez   Plan    - Patient would not need NCC admission at this time  - Repeat CTH 3 hours from the prior scan, if sedation is needed to limit streak artifact, use precedex 0.3 mcg when going for scan  - Continue to hold antiplatelet/anticoagulation  - Work up for possible metabolic encephalitis  - f/u Neurology recommendation  - Correct metabolic derangement  - Reconsult NCC as needed.    Case discussed with NCC attending Dr. Rodriguez

## 2024-02-10 NOTE — CONSULT NOTE ADULT - TIME BILLING
Assessment  Stroke/ low suspicion for SDH     Plan    - Patient would not need NCC admission at this time  - Repeat CTH 3 hours from the prior scan, if sedation is needed to limit streak artifact, use precedex 0.3 mcg when going for scan  - Continue to hold antiplatelet/anticoagulation till repeat completed in 3 hrs  - f/u Neurology recommendation  - Correct metabolic derangement  - PT/OT  - Reconsult NCC as needed.

## 2024-02-10 NOTE — CONSULT NOTE ADULT - ATTENDING COMMENTS
Appears encephalopathic, likely aspirating secretions, not following commands. Withdraws R>L to noxious. Equivocal findings on initial CTH. Would repeat CTH to assess for interval evolution. Reasonable to obtain vessel imaging concomitantly to assess for vascular anomaly. Agree with holding heparin gtt until repeat scan. Rest as above.

## 2024-02-10 NOTE — CONSULT NOTE ADULT - SUBJECTIVE AND OBJECTIVE BOX
NEUROLOGY CONSULT    HPI:  89 year old male, poor historian, former smoker, with PMHx of CAD with RCA stents, HTN, HLD, GERD, [place] who was BIBEMS for worsening non-radiating chest pain that started yesterday. Pt describes pain as sharp, midsternal, lasting for a few minutes at a time and self-resolves. Pt reports having intermittent chest pain for approximately 1 month but the past 2 days it has worsened, rating 8/10. He follows Dr. Martinez at [facility]. Pt currently chest pain free. Denies SOB, fevers/chills, N/V, Denies recent travel and recent illness.   In the ED: VS 98.3F, HR 75, /61, O2 sat 95% on room air. 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 Labs were significant for cr of 1.8 (baseline cr 1.0-1.1 in 10/2023), troponin 1409. CXR neg for cardiopulmonary disease. Pt received  mg x1, Plavix 300 mg x1, and was started on Heparin drip. Of note, attempted to call Karen mensah) multiple times for collateral information however, she did not respond.  (2024 18:46)    Neurology was consulted for L sided weakness and confusion which appeared to be new according the notes. LKW - couple days (per his neighbor). Not sure when L sided weakness started. Overnight put on NC - 3-5L for desat and gurgling.      MEDICATIONS  Home Medications:  aspirin 81 mg oral delayed release tablet: 1 tab(s) orally once a day (2024 18:59)  atorvastatin 10 mg oral tablet: 1 tab(s) orally once a day (2024 19:00)  KlonoPIN 1 mg oral tablet: 1 tab(s) orally 2 times a day (2024 19:01)  metoprolol succinate 25 mg oral capsule, extended release: 1 cap(s) orally once a day (2024 19:00)  omeprazole 40 mg oral delayed release capsule: 1 cap(s) orally once a day (2024 19:02)  Plavix 75 mg oral tablet: 1 tab(s) orally once a day (2024 19:00)    MEDICATIONS  (STANDING):  ampicillin/sulbactam  IVPB      ampicillin/sulbactam  IVPB 3 Gram(s) IV Intermittent every 6 hours  atorvastatin 40 milliGRAM(s) Oral at bedtime  azithromycin  IVPB      metoprolol succinate ER 25 milliGRAM(s) Oral daily  pantoprazole  Injectable 40 milliGRAM(s) IV Push daily    MEDICATIONS  (PRN):      FAMILY HISTORY:  No pertinent family history in first degree relatives      SOCIAL HISTORY: negative for tobacco, alcohol, or ilicit drug use.    Allergies    No Known Allergies    Intolerances        GEN: NAD, pleasant, cooperative    NEUROLOGICAL EXAMINATION:  GENERAL:  Appearance is consistent with chronologic age.   COGNITION/LANGUAGE:  Awake, alert, and oriented to person, place, time and date.  Fluent, intact comprehension, repetition, naming. Recent and remote memory intact.  Fund of knowledge is appropriate.  Nondysarthric.    CRANIAL NERVES:   - Eyes:  Visual acuity intact. Pupils equal round and reactive, no RAPD. EOMI w/o nystagmus, skew or reported double vision. Normal visual field on confrontation. No ptosis/weakness of eyelid closure.   - Face:  Facial sensation normal V1 - 3, no facial asymmetry.    - Ears/Nose/Throat:  Hearing grossly intact b/l to finger rub.  Palate elevates midline.  Tongue and uvula midline.   MOTOR EXAM:  (R/L) 5/5 UE; 5/5 LE.  No observable drift. Normal tone and bulk. No tenderness, twitching, tremors or involuntary movements.  SENSORY EXAM:  Intact to light touch and pinprick, pain, temperature and proprioception and vibration in all extremities.  REFLEXES:   2+ b/l biceps, triceps, patella and achilles.  Plantar response downgoing b/l.  Jaw jerk, Camilla, clonus absent.  CEREBELLUM:  Finger to nose/Heel to knee and shin intact.  No dysmetria.    GAIT: narrow based and normal.  Romberg: negative.   NIHSS: 10 (LOC-4, gaze-1, visual-1, possible L sided drift, dysarthria - 2)    LABS:                        11.0   8.04  )-----------( 95       ( 10 Feb 2024 05:00 )             33.7     02-10    135  |  103  |  35<H>  ----------------------------<  103<H>  4.2   |  21  |  1.7<H>    Ca    8.7      10 Feb 2024 05:00  Mg     2.0     02-10    TPro  6.1  /  Alb  3.8  /  TBili  1.8<H>  /  DBili  x   /  AST  45<H>  /  ALT  22  /  AlkPhos  62  02-10    Hemoglobin A1C:   Vitamin B12   PT/INR - ( 10 Feb 2024 05:00 )   PT: 16.30 sec;   INR: 1.42 ratio         PTT - ( 10 Feb 2024 09:40 )  PTT:45.8 sec  CAPILLARY BLOOD GLUCOSE      Urinalysis Basic - ( 10 Feb 2024 10:30 )    Color: Yellow / Appearance: Clear / S.008 / pH: x  Gluc: x / Ketone: Negative mg/dL  / Bili: Negative / Urobili: 1.0 mg/dL   Blood: x / Protein: Negative mg/dL / Nitrite: Negative   Leuk Esterase: Negative / RBC: 1 /HPF / WBC 0 /HPF   Sq Epi: x / Non Sq Epi: 0 /HPF / Bacteria: Negative /HPF        ABG - ( 10 Feb 2024 05:40 )  pH, Arterial: 7.42  pH, Blood: x     /  pCO2: 34    /  pO2: 70    / HCO3: 22    / Base Excess: -1.7  /  SaO2: 95.9        Microbiology:      RADIOLOGY, EKG AND ADDITIONAL TESTS: Reviewed.

## 2024-02-10 NOTE — CONSULT NOTE ADULT - SUBJECTIVE AND OBJECTIVE BOX
HPI:  89 year old male, poor historian, former smoker, with PMHx of CAD with RCA stents, HTN, HLD, GERD, [place] who was BIBEMS for worsening non-radiating chest pain that started yesterday. Pt describes pain as sharp, midsternal, lasting for a few minutes at a time and self-resolves. Pt reports having intermittent chest pain for approximately 1 month but the past 2 days it has worsened, rating 8/10. He [facility] Dr. Martinez at [facility]. Pt currently chest pain free. Denies SOB, fevers/chills, N/V, Denies recent travel and recent illness.     In the ED: VS 98.3F, HR 75, /61, O2 sat 95% on room air. 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 Labs were significant for cr of 1.8 (baseline cr 1.0-1.1 in 10/2023), troponin 1409. CXR neg for cardiopulmonary disease. Pt received  mg x1, Plavix 300 mg x1, and was started on Heparin drip.     Of note, attempted to call Karen mensah) multiple times for collateral information however, she did not respond.  (2024 18:46)      Neurology was consulted for L sided weakness and confusion which appeared to be new according the notes. LKW - couple days (per his neighbor). Not sure when L sided weakness started. Overnight put on NC - 3-5L for desat and gurgling. Pt had a Head CT which showed     < from: CT Head No Cont (02.10.24 @ 10:06) >    Motion degraded exam with streak artifacts. Thin hyperdensity along the   high right frontoparietal convexity could be artifact versus thin   subdural hemorrhage. Recommend short-term follow-up CT head. No large   acute territorial infarct.    < end of copied text >     Pt seen and examined at bedside. Pt awake, answering few questions. Niece at bedside. All anticoagulation/antiplatelet therapy held    PAST MEDICAL & SURGICAL HISTORY:  HTN (hypertension)      CAD (coronary artery disease)  BPH (benign prostatic hyperplasia)  HLD (hyperlipidemia)      Home Medications:  aspirin 81 mg oral delayed release tablet: 1 tab(s) orally once a day (2024 18:59)  atorvastatin 10 mg oral tablet: 1 tab(s) orally once a day (2024 19:00)  KlonoPIN 1 mg oral tablet: 1 tab(s) orally 2 times a day (2024 19:01)  metoprolol succinate 25 mg oral capsule, extended release: 1 cap(s) orally once a day (2024 19:00)  omeprazole 40 mg oral delayed release capsule: 1 cap(s) orally once a day (2024 19:02)  Plavix 75 mg oral tablet: 1 tab(s) orally once a day (2024 19:00)      Allergies    No Known Allergies    Intolerances        ROS:  [  ] A ten-point review of systems is negative except as noted   [X] Due to altered mental status/intubation, subjective information were not able to be obtained from the patient. History was obtained, to the extent possible, from review of the chart and collateral sources of information    MEDICATIONS  (STANDING):  ampicillin/sulbactam  IVPB 3 Gram(s) IV Intermittent every 6 hours  ampicillin/sulbactam  IVPB      atorvastatin 40 milliGRAM(s) Oral at bedtime  azithromycin  IVPB        MEDICATIONS  (PRN):      ICU Vital Signs Last 24 Hrs  T(C): 36.8 (10 Feb 2024 08:00), Max: 36.8 (10 Feb 2024 08:00)  T(F): 98.3 (10 Feb 2024 08:00), Max: 98.3 (10 Feb 2024 08:00)  HR: 92 (10 Feb 2024 15:46) (68 - 96)  BP: 120/71 (10 Feb 2024 15:46) (115/72 - 151/73)  BP(mean): 88 (10 Feb 2024 15:46) (83 - 107)  ABP: --  ABP(mean): --  RR: 20 (10 Feb 2024 15:46) (18 - 22)  SpO2: 98% (10 Feb 2024 15:46) (97% - 100%)    O2 Parameters below as of 10 Feb 2024 15:46  Patient On (Oxygen Delivery Method): nasal cannula        I&O's Detail    2024 07:01  -  10 Feb 2024 07:00  --------------------------------------------------------  IN:    Heparin Infusion: 94.5 mL    Oral Fluid: 80 mL  Total IN: 174.5 mL    OUT:    Voided (mL): 250 mL  Total OUT: 250 mL    Total NET: -75.5 mL                           11.0   8.04  )-----------( 95       ( 10 Feb 2024 05:00 )             33.7     02-10    135  |  103  |  35<H>  ----------------------------<  103<H>  4.2   |  21  |  1.7<H>    Ca    8.7      10 Feb 2024 05:00  Mg     2.0     02-10    TPro  6.1  /  Alb  3.8  /  TBili  1.8<H>  /  DBili  x   /  AST  45<H>  /  ALT  22  /  AlkPhos  62  02-10        Urinalysis Basic - ( 10 Feb 2024 10:30 )    Color: Yellow / Appearance: Clear / S.008 / pH: x  Gluc: x / Ketone: Negative mg/dL  / Bili: Negative / Urobili: 1.0 mg/dL   Blood: x / Protein: Negative mg/dL / Nitrite: Negative   Leuk Esterase: Negative / RBC: 1 /HPF / WBC 0 /HPF   Sq Epi: x / Non Sq Epi: 0 /HPF / Bacteria: Negative /HPF        On PE:    Not answering name, place or time  Followed few commands  Pupils reactive  ? tracking  No droop  squeezed right hand to commands  Moves right side spontaneously  Moved LUE to commands  Min mvmt of LLE to pain    Assessment:  As above    Plan:  No Neurosurgical Intervention  Repeat HCT

## 2024-02-10 NOTE — PROGRESS NOTE ADULT - ASSESSMENT
Assessment:    89 year old male, poor historian, former smoker, with PMHx of CAD with RCA stents(years ago at [facility]), HTN, HLD, GERD, [place] who was BIBEMS for worsening non-radiating chest pain, s/p unwitnessed fall 2/9 AM. Pt was admitted to cardiac telemetry for NSTEMI.  Pt was loaded with ASA/plavix, started on IV heparin drip, 2/10 AM pt noted to be more confused with dysarthria, L sided facial droop, L sided weakness UE>LE, +gurgling anterior chest /neck, sats 97% on 3 L NC. Patient not following commands, pointing towards R side ceiling. . Neurology called and CThead obtained showed possible small R SDH vs artifact. Last known normal .-->unknown.       Problems discussed and associated plan:  # s/p unwitnessed fall, r/o CVA vs metabolic encephalopathy , ?R small SDH, (though likely artifact)  - CT head 2/10 Motion degraded exam with streak artifacts. Thin hyperdensity along the high right frontoparietal convexity could be artifact versus thin subdural hemorrhage. Recommend short-term follow-up CT head. No large acute territorial infarct.  - As per nash, pt independent with ambulation at baseline, mild dementia but past 2 weeks has been more forgetful   - Monitor on telemetry  - neurology, neuro sx consulted -->recommending repeat CT head around 6 PM (give Precedex 0.3 mg before as pt w intermittent agitation)  - IV heparin on hold as of AM, ASA/plavix on hold until rep CT scan r/o bleed   - fall/aspiration/seizure precautions    - Pt not alert enough to tolerate PO currently, NPO placed  - swallow eval when improved   - family meeting w nash Jones->pt DNR/DNI , does not wish to have aggresive interventions for the pt   - TTE pending   - frequent neuro checks  - Hemodynamically stable currently   - f/u neuro  recs   - r/o infectious/metabolic causes  - UA negative wbc, bacteria, nitrite/leuks   - ABG 2/10 AM ( LA 1.6 , pH 7.42, pCO2 34, pO2 70, O2 sats 95%)  - LA now trended up tp 2.6 trend  - CXR showing R sided opacity , started on abx   - electrolytes wnl  - TSH wnl   - f/u bcx from 2/10   - O2 2-3 L to keep O2 sats >94%  - f HBA1C 5.2   -  lipid profile LDL 50,Triglycerides 59    #NSTEMI  - As per niece pt has been complaining chest pain for long time  - Trop 1400-->1200-->1500-->1280  - IV heparin on hold as of AM, ASA/plavix on hold until rep CT scan r/o bleed   - Increased atorvastatin 10 mg qd increased to 40mg -->but on hold as pt not candidate for PO now   - Obtain TTE   - Pt does not appear to be candidate for cardiac angiogram due to mental status      #Pneumonia ,possible aspiration PNA  - CXR Right basilar opacity  - started on IV Unsayn and Azithromycin  - monitor wbc and fever curve   - strict Aspiration precautions   - monitor respiratory status, currently sats 95% on 3 L NC  - consider nebs if needed     #HAYLEE  - Cr 1.8 on admission-->1.7 today  (baseline Cr 1.0-1.1 in Oct 2023)  - Monitor BMP   - avoid nephrotoxins   - pt has warren now, monitor strict Is and Os   - monitor Cr and lytes     #Thrombocytopenia   - chronic   - plts Oct 2023 110-->current   - monitor plts 95      #GERD  - on protonix , reduced dose to 20 mg IV due to Cr     #HLD  - Increased atorvastatin to 40 mg (from 10 mg) qhs   -  lipid profile LDL 50,Triglycerides 59    #DVT ppx/GI ppx  - GI ppx on hold until brain bleed ruled out   - IV protonix 40 mg daily     #GOC  - Niece Karen Elver  at bedside, GOC discussion was done, family made aware of patient's poor prognosis. Karen reports  she is hcp, and wants DNR/DNI-->MOLST form signed     Please contact me with any questions or concerns at [phone].

## 2024-02-10 NOTE — PROGRESS NOTE ADULT - SUBJECTIVE AND OBJECTIVE BOX
Chief complaint: Patient is a 89y old  Male who presents with a chief complaint of chest pain (10 Feb 2024 14:18)    Interval history:89 year old male, poor historian, former smoker, with PMHx of CAD with RCA stents(years ago at [facility]), HTN, HLD, GERD, [place] who was BIBEMS for worsening non-radiating chest pain, s/p unwitnessed fall 2/9 AM. Pt was admitted to cardiac telemetry for NSTEMI.  Pt was loaded with ASA/plavix, started on IV heparin drip, 2/10 AM pt noted to be more confused with dysarthria, L sided facial droop, L sided weakness UE>LE, +gurgling anterior chest /neck, sats 97% on 3 L NC. Patient not following commands, pointing towards R side ceiling. Last known normal-->unknown. IV heparin, and DAPT held.  Neurology called and CThead obtained showed possible small R SDH vs artifact.       Review of systems: A complete 10-point review of systems was obtained and is negative except as stated in the interval history.    Vitals:  T(F): 98.3, Max: 98.3 (02-10 @ 08:00)  HR: 92 (68 - 96)  BP: 120/71 (115/72 - 151/73)  RR: 20 (18 - 22)  SpO2: 98% (97% - 100%)    Ins & outs:     02-09 @ 07:01  -  02-10 @ 07:00  --------------------------------------------------------  IN: 174.5 mL / OUT: 250 mL / NET: -75.5 mL      Weight trend:  Weight (kg): 77.1 (02-09)    Physical exam:  General: No apparent distress  HEENT: Anicteric sclera. Moist mucous membranes. JVP *** cm.   Cardiac: Regular rate and rhythm. No murmurs, rubs, or gallops.   Vascular: Symmetric radial pulses. Dorsalis pedis pulses palpable.   Respiratory: Normal effort. Bibasilar crackles. Clear to ascultation.   Abdomen: Soft, nontender. Audible bowel sounds.   Extremities: Warm with *** edema. No cyanosis or clubbing.   Skin: Warm and dry. No rash.   Neurologic: Grossly normal motor function.   Psychiatric: Oriented to person, place, and time.     Data reviewed:  - Telemetry: NSR 67-90   - ECG  < from: 12 Lead ECG (02.09.24 @ 12:17) >  Ventricular Rate 73 BPM    QRS Duration 126 ms    Q-T Interval 418 ms    QTC Calculation(Bazett) 460 ms    R Axis -87 degrees    T Axis 63 degrees    Diagnosis Line Sinus rhythm with first degree AV block and premature atrial complexes  Left axis deviation  Non-specific intra-ventricular conduction block  Minimal voltage criteria for LVH, may be normal variant ( Josephine product )  Cannot rule out Septal infarct  Abnormal ECG  Confirmed by Rob Tillman ([id number]) on 2/9/2024 12:37:19 PM    < end of copied text >    - TTE (date***): pending   - Chest x-ray < from: Xray Chest 1 View- PORTABLE-Urgent (Xray Chest 1 View- PORTABLE-Urgent .) (02.10.24 @ 06:30) >  Impression:    Right basilar opacity.    < end of copied text >     CT Head No Cont (02.10.24 @ 10:06) >  IMPRESSION:    Motion degraded exam with streak artifacts. Thin hyperdensity along the   high right frontoparietal convexity could be artifact versus thin   subdural hemorrhage. Recommend short-term follow-up CT head. No large   acute territorial infarct.    < end of copied text >       Xray Pelvis AP only (02.09.24 @ 15:16) >  IMPRESSION:    Bones are osteopenic.    No definite evidence of acute displaced fracture or dislocation.    Lumbosacral spine, sacroiliac joint, pubic symphysis and bilateral hip   degenerative changes.    < end of copied text >      - Stress test:   - CCTA:  - Cardiac catheterization:  - Cardiac MRI:    - Labs:                        11.0   8.04  )-----------( 95       ( 10 Feb 2024 05:00 )             33.7     02-10    135  |  103  |  35<H>  ----------------------------<  103<H>  4.2   |  21  |  1.7<H>    Ca    8.7      10 Feb 2024 05:00  Mg     2.0     02-10    TPro  6.1  /  Alb  3.8  /  TBili  1.8<H>  /  DBili  x   /  AST  45<H>  /  ALT  22  /  AlkPhos  62  02-10    PT/INR - ( 10 Feb 2024 05:00 )   PT: 16.30 sec;   INR: 1.42 ratio         PTT - ( 10 Feb 2024 09:40 )  PTT:45.8 sec        Triglycerides, Serum: 59 mg/dL (02-10-24 @ 05:00)  LDL Cholesterol Calculated: 50 mg/dL (02-10-24 @ 05:00)    Thyroid Stimulating Hormone, Serum: 0.95 uIU/mL (02-10-24 @ 09:40)  Thyroid Stimulating Hormone, Serum: 1.17 uIU/mL (02-10-24 @ 05:00)            Medications:  ampicillin/sulbactam  IVPB      ampicillin/sulbactam  IVPB 3 Gram(s) IV Intermittent every 6 hours  atorvastatin 40 milliGRAM(s) Oral at bedtime  azithromycin  IVPB        Drips:    PRN:     Allergies    No Known Allergies    Intolerances       Chief complaint: Patient is a 89y old  Male who presents with a chief complaint of chest pain (10 Feb 2024 14:18)    Interval history:89 year old male, poor historian, former smoker, with PMHx of CAD with RCA stents(years ago at [facility]), HTN, HLD, GERD, [place] who was BIBEMS for worsening non-radiating chest pain, s/p unwitnessed fall 2/9 AM. Pt was admitted to cardiac telemetry for NSTEMI.  Pt was loaded with ASA/plavix, started on IV heparin drip, 2/10 AM pt noted to be more confused with dysarthria, L sided facial droop, L sided weakness UE>LE, +gurgling anterior chest /neck, sats 97% on 3 L NC. Patient not following commands, pointing towards R side ceiling. Last known normal-->unknown. IV heparin, and DAPT held.  Neurology called and CThead obtained showed possible small R SDH vs artifact.       Review of systems: A complete 10-point review of systems was obtained and is negative except as stated in the interval history.    Vitals:  T(F): 98.3, Max: 98.3 (02-10 @ 08:00)  HR: 92 (68 - 96)  BP: 120/71 (115/72 - 151/73)  RR: 20 (18 - 22)  SpO2: 98% (97% - 100%)    Ins & outs:     02-09 @ 07:01  -  02-10 @ 07:00  --------------------------------------------------------  IN: 174.5 mL / OUT: 250 mL / NET: -75.5 mL      Weight trend:  Weight (kg): 77.1 (02-09)    Physical exam:  General: Elderly male, appears confused , sats 97% on 3 L NC  HEENT: Anicteric sclera. Moist mucous membranes.   Cardiac: Regular rate and rhythm. No murmurs, rubs, or gallops.   Vascular: Symmetric radial pulses. Dorsalis pedis pulses palpable.   Respiratory: +anterior audible gurgling , no tachypnea/accessory muscle use   Neurologic: lethargic, confused, does not follow commands, L sided facial droop and L extremity weakness LUE >LLE +dysarthria, pointing towards ceiling on R side. +mild agitation present ?visual hallucination  Abdomen: Soft, no ntender. Audible bowel sounds.   Extremities: Warm with no edema. No cyanosis or clubbing.   Skin: Warm and dry. No rash.         Data reviewed:  - Telemetry: NSR 67-90   - ECG  < from: 12 Lead ECG (02.09.24 @ 12:17) >  Ventricular Rate 73 BPM    QRS Duration 126 ms    Q-T Interval 418 ms    QTC Calculation(Bazett) 460 ms    R Axis -87 degrees    T Axis 63 degrees    Diagnosis Line Sinus rhythm with first degree AV block and premature atrial complexes  Left axis deviation  Non-specific intra-ventricular conduction block  Minimal voltage criteria for LVH, may be normal variant ( Praveen product )  Cannot rule out Septal infarct  Abnormal ECG  Confirmed by Rob Tillman ([id number]) on 2/9/2024 12:37:19 PM    < end of copied text >    - TTE (date***): pending   - Chest x-ray < from: Xray Chest 1 View- PORTABLE-Urgent (Xray Chest 1 View- PORTABLE-Urgent .) (02.10.24 @ 06:30) >  Impression:    Right basilar opacity.    < end of copied text >     CT Head No Cont (02.10.24 @ 10:06) >  IMPRESSION:    Motion degraded exam with streak artifacts. Thin hyperdensity along the   high right frontoparietal convexity could be artifact versus thin   subdural hemorrhage. Recommend short-term follow-up CT head. No large   acute territorial infarct.    < end of copied text >       Xray Pelvis AP only (02.09.24 @ 15:16) >  IMPRESSION:    Bones are osteopenic.    No definite evidence of acute displaced fracture or dislocation.    Lumbosacral spine, sacroiliac joint, pubic symphysis and bilateral hip   degenerative changes.    < end of copied text >      - Stress test:   - CCTA:  - Cardiac catheterization:  - Cardiac MRI:    - Labs:                        11.0   8.04  )-----------( 95       ( 10 Feb 2024 05:00 )             33.7     02-10    135  |  103  |  35<H>  ----------------------------<  103<H>  4.2   |  21  |  1.7<H>    Ca    8.7      10 Feb 2024 05:00  Mg     2.0     02-10    TPro  6.1  /  Alb  3.8  /  TBili  1.8<H>  /  DBili  x   /  AST  45<H>  /  ALT  22  /  AlkPhos  62  02-10    PT/INR - ( 10 Feb 2024 05:00 )   PT: 16.30 sec;   INR: 1.42 ratio         PTT - ( 10 Feb 2024 09:40 )  PTT:45.8 sec        Triglycerides, Serum: 59 mg/dL (02-10-24 @ 05:00)  LDL Cholesterol Calculated: 50 mg/dL (02-10-24 @ 05:00)    Thyroid Stimulating Hormone, Serum: 0.95 uIU/mL (02-10-24 @ 09:40)  Thyroid Stimulating Hormone, Serum: 1.17 uIU/mL (02-10-24 @ 05:00)            Medications:  ampicillin/sulbactam  IVPB      ampicillin/sulbactam  IVPB 3 Gram(s) IV Intermittent every 6 hours  atorvastatin 40 milliGRAM(s) Oral at bedtime  azithromycin  IVPB        Drips:    PRN:     Allergies    No Known Allergies    Intolerances

## 2024-02-10 NOTE — CONSULT NOTE ADULT - SUBJECTIVE AND OBJECTIVE BOX
89 year old male, poor historian, former smoker, with PMHx of CAD with RCA stents, HTN, HLD, GERD, [place] who was BIBEMS for worsening non-radiating chest pain that started yesterday. Pt describes pain as sharp, midsternal, lasting for a few minutes at a time and self-resolves. Pt reports having intermittent chest pain for approximately 1 month but the past 2 days it has worsened, rating 8/10. He follows Dr. Martinez at [facility]. Pt currently chest pain free. Denies SOB, fevers/chills, N/V, Denies recent travel and recent illness.     In the ED: VS 98.3F, HR 75, /61, O2 sat 95% on room air. 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 Labs were significant for cr of 1.8 (baseline cr 1.0-1.1 in 10/2023), troponin 1409. CXR neg for cardiopulmonary disease. Pt received  mg x1, Plavix 300 mg x1, and was started on Heparin drip.     Of note, attempted to call Karen mensah) multiple times for collateral information however, she did not respond.         ICU Vital Signs Last 24 Hrs  T(C): 36.8 (10 Feb 2024 08:00), Max: 36.8 (10 Feb 2024 08:00)  T(F): 98.3 (10 Feb 2024 08:00), Max: 98.3 (10 Feb 2024 08:00)  HR: 96 (10 Feb 2024 08:00) (68 - 96)  BP: 151/73 (10 Feb 2024 08:00) (116/63 - 151/73)  BP(mean): 92 (10 Feb 2024 08:00) (83 - 107)  ABP: --  ABP(mean): --  RR: 21 (10 Feb 2024 08:00) (18 - 22)  SpO2: 100% (10 Feb 2024 08:00) (99% - 100%)      02-09-24 @ 07:01  -  02-10-24 @ 07:00  --------------------------------------------------------  IN: 174.5 mL / OUT: 250 mL / NET: -75.5 mL          LABS:  Na: 135 (02-10 @ 05:00), 141 (02-09 @ 14:43)  K: 4.2 (02-10 @ 05:00), 5.1 (02-09 @ 14:43)  Cl: 103 (02-10 @ 05:00), 105 (02-09 @ 14:43)  CO2: 21 (02-10 @ 05:00), 22 (02-09 @ 14:43)  BUN: 35 (02-10 @ 05:00), 32 (02-09 @ 14:43)  Cr: 1.7 (02-10 @ 05:00), 1.8 (02-09 @ 14:43)  Glu: 103(02-10 @ 05:00), 97(02-09 @ 14:43)    Hgb: 11.0 (02-10 @ 05:00), 10.6 (02-10 @ 01:20), 11.3 (02-09 @ 14:43)  Hct: 33.7 (02-10 @ 05:00), 31.8 (02-10 @ 01:20), 34.6 (02-09 @ 14:43)  WBC: 8.04 (02-10 @ 05:00), 6.10 (02-10 @ 01:20), 7.12 (02-09 @ 14:43)  Plt: 95 (02-10 @ 05:00), 80 (02-10 @ 01:20), 115 (02-09 @ 14:43)    INR: 1.42 02-10-24 @ 05:00, 1.38 02-09-24 @ 18:58  PTT: 45.8 02-10-24 @ 09:40, 107.2 02-10-24 @ 01:20, 31.5 02-09-24 @ 18:58          LIVER FUNCTIONS - ( 10 Feb 2024 05:00 )  Alb: 3.8 g/dL / Pro: 6.1 g/dL / ALK PHOS: 62 U/L / ALT: 22 U/L / AST: 45 U/L / GGT: x           ABG - ( 10 Feb 2024 05:40 )  pH, Arterial: 7.42  pH, Blood: x     /  pCO2: 34    /  pO2: 70    / HCO3: 22    / Base Excess: -1.7  /  SaO2: 95.9                  MEDICATIONS  (STANDING):  ampicillin/sulbactam  IVPB 3 Gram(s) IV Intermittent every 6 hours  ampicillin/sulbactam  IVPB      atorvastatin 40 milliGRAM(s) Oral at bedtime  azithromycin  IVPB      metoprolol succinate ER 25 milliGRAM(s) Oral daily  pantoprazole  Injectable 40 milliGRAM(s) IV Push daily    MEDICATIONS  (PRN):   89 year old male, poor historian, former smoker, with PMHx of CAD with RCA stents, HTN, HLD, GERD, [place] who was BIBEMS for worsening non-radiating chest pain that started yesterday. Pt describes pain as sharp, midsternal, lasting for a few minutes at a time and self-resolves. Pt reports having intermittent chest pain for approximately 1 month but the past 2 days it has worsened, rating 8/10. He follows Dr. Martinez at [facility]. Pt currently chest pain free. Denies SOB, fevers/chills, N/V, Denies recent travel and recent illness.     In the ED: VS 98.3F, HR 75, /61, O2 sat 95% on room air. 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 Labs were significant for cr of 1.8 (baseline cr 1.0-1.1 in 10/2023), troponin 1409. CXR neg for cardiopulmonary disease. Pt received  mg x1, Plavix 300 mg x1, and was started on Heparin drip.     CTH: Thin hyperdensity along the high right frontoparietal convexity could be artifact versus thin subdural hemorrhage. Recommend short-term follow-up CT head. No large acute territorial infarct      ICU Vital Signs Last 24 Hrs  T(C): 36.8 (10 Feb 2024 08:00), Max: 36.8 (10 Feb 2024 08:00)  T(F): 98.3 (10 Feb 2024 08:00), Max: 98.3 (10 Feb 2024 08:00)  HR: 96 (10 Feb 2024 08:00) (68 - 96)  BP: 151/73 (10 Feb 2024 08:00) (116/63 - 151/73)  BP(mean): 92 (10 Feb 2024 08:00) (83 - 107)  ABP: --  ABP(mean): --  RR: 21 (10 Feb 2024 08:00) (18 - 22)  SpO2: 100% (10 Feb 2024 08:00) (99% - 100%)      02-09-24 @ 07:01  -  02-10-24 @ 07:00  --------------------------------------------------------  IN: 174.5 mL / OUT: 250 mL / NET: -75.5 mL          LABS:  Na: 135 (02-10 @ 05:00), 141 (02-09 @ 14:43)  K: 4.2 (02-10 @ 05:00), 5.1 (02-09 @ 14:43)  Cl: 103 (02-10 @ 05:00), 105 (02-09 @ 14:43)  CO2: 21 (02-10 @ 05:00), 22 (02-09 @ 14:43)  BUN: 35 (02-10 @ 05:00), 32 (02-09 @ 14:43)  Cr: 1.7 (02-10 @ 05:00), 1.8 (02-09 @ 14:43)  Glu: 103(02-10 @ 05:00), 97(02-09 @ 14:43)    Hgb: 11.0 (02-10 @ 05:00), 10.6 (02-10 @ 01:20), 11.3 (02-09 @ 14:43)  Hct: 33.7 (02-10 @ 05:00), 31.8 (02-10 @ 01:20), 34.6 (02-09 @ 14:43)  WBC: 8.04 (02-10 @ 05:00), 6.10 (02-10 @ 01:20), 7.12 (02-09 @ 14:43)  Plt: 95 (02-10 @ 05:00), 80 (02-10 @ 01:20), 115 (02-09 @ 14:43)    INR: 1.42 02-10-24 @ 05:00, 1.38 02-09-24 @ 18:58  PTT: 45.8 02-10-24 @ 09:40, 107.2 02-10-24 @ 01:20, 31.5 02-09-24 @ 18:58          LIVER FUNCTIONS - ( 10 Feb 2024 05:00 )  Alb: 3.8 g/dL / Pro: 6.1 g/dL / ALK PHOS: 62 U/L / ALT: 22 U/L / AST: 45 U/L / GGT: x           ABG - ( 10 Feb 2024 05:40 )  pH, Arterial: 7.42  pH, Blood: x     /  pCO2: 34    /  pO2: 70    / HCO3: 22    / Base Excess: -1.7  /  SaO2: 95.9                  MEDICATIONS  (STANDING):  ampicillin/sulbactam  IVPB 3 Gram(s) IV Intermittent every 6 hours  ampicillin/sulbactam  IVPB      atorvastatin 40 milliGRAM(s) Oral at bedtime  azithromycin  IVPB      metoprolol succinate ER 25 milliGRAM(s) Oral daily  pantoprazole  Injectable 40 milliGRAM(s) IV Push daily    MEDICATIONS  (PRN):   89 year old male, poor historian, former smoker, with PMHx of CAD with RCA stents, HTN, HLD, GERD, [place] who was BIBEMS for worsening non-radiating chest pain that started yesterday. Pt describes pain as sharp, midsternal, lasting for a few minutes at a time and self-resolves. Pt reports having intermittent chest pain for approximately 1 month but the past 2 days it has worsened, rating 8/10. He follows Dr. Martinez at [facility]. Pt currently chest pain free. Denies SOB, fevers/chills, N/V, Denies recent travel and recent illness.     In the ED: VS 98.3F, HR 75, /61, O2 sat 95% on room air. 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 Labs were significant for cr of 1.8 (baseline cr 1.0-1.1 in 10/2023), troponin 1409. CXR neg for cardiopulmonary disease. Pt received  mg x1, Plavix 300 mg x1, and was started on Heparin drip.     Hospital course c/b worsening left side weakness and AMS. CTH shows thin hyperdensity along the high right frontoparietal convexity could be artifact versus thin subdural hemorrhage. Recommend short-term follow-up CT head. No large acute territorial infarct. NCC consulting for escalation of care.    Physical Exam:  General: Visual hallucination, no acute distress  HEENT: Anicteric sclerae  Cardiac: [id number]  Lungs: Ronchi  Abdomen: Soft, non-tender, +BS  Extremities: No c/c/e  Skin/Incision Site: Clean, dry and intact  Neurologic: AAOx0, follows commands, PERRL, severe dysarthria, right gaze preference, slight right facial droop, spont in all four R>L, sensation intact      ICU Vital Signs Last 24 Hrs  T(C): 36.8 (10 Feb 2024 08:00), Max: 36.8 (10 Feb 2024 08:00)  T(F): 98.3 (10 Feb 2024 08:00), Max: 98.3 (10 Feb 2024 08:00)  HR: 96 (10 Feb 2024 08:00) (68 - 96)  BP: 151/73 (10 Feb 2024 08:00) (116/63 - 151/73)  BP(mean): 92 (10 Feb 2024 08:00) (83 - 107)  ABP: --  ABP(mean): --  RR: 21 (10 Feb 2024 08:00) (18 - 22)  SpO2: 100% (10 Feb 2024 08:00) (99% - 100%)      02-09-24 @ 07:01  -  02-10-24 @ 07:00  --------------------------------------------------------  IN: 174.5 mL / OUT: 250 mL / NET: -75.5 mL          LABS:  Na: 135 (02-10 @ 05:00), 141 (02-09 @ 14:43)  K: 4.2 (02-10 @ 05:00), 5.1 (02-09 @ 14:43)  Cl: 103 (02-10 @ 05:00), 105 (02-09 @ 14:43)  CO2: 21 (02-10 @ 05:00), 22 (02-09 @ 14:43)  BUN: 35 (02-10 @ 05:00), 32 (02-09 @ 14:43)  Cr: 1.7 (02-10 @ 05:00), 1.8 (02-09 @ 14:43)  Glu: 103(02-10 @ 05:00), 97(02-09 @ 14:43)    Hgb: 11.0 (02-10 @ 05:00), 10.6 (02-10 @ 01:20), 11.3 (02-09 @ 14:43)  Hct: 33.7 (02-10 @ 05:00), 31.8 (02-10 @ 01:20), 34.6 (02-09 @ 14:43)  WBC: 8.04 (02-10 @ 05:00), 6.10 (02-10 @ 01:20), 7.12 (02-09 @ 14:43)  Plt: 95 (02-10 @ 05:00), 80 (02-10 @ 01:20), 115 (02-09 @ 14:43)    INR: 1.42 02-10-24 @ 05:00, 1.38 02-09-24 @ 18:58  PTT: 45.8 02-10-24 @ 09:40, 107.2 02-10-24 @ 01:20, 31.5 02-09-24 @ 18:58          LIVER FUNCTIONS - ( 10 Feb 2024 05:00 )  Alb: 3.8 g/dL / Pro: 6.1 g/dL / ALK PHOS: 62 U/L / ALT: 22 U/L / AST: 45 U/L / GGT: x           ABG - ( 10 Feb 2024 05:40 )  pH, Arterial: 7.42  pH, Blood: x     /  pCO2: 34    /  pO2: 70    / HCO3: 22    / Base Excess: -1.7  /  SaO2: 95.9                  MEDICATIONS  (STANDING):  ampicillin/sulbactam  IVPB 3 Gram(s) IV Intermittent every 6 hours  ampicillin/sulbactam  IVPB      atorvastatin 40 milliGRAM(s) Oral at bedtime  azithromycin  IVPB      metoprolol succinate ER 25 milliGRAM(s) Oral daily  pantoprazole  Injectable 40 milliGRAM(s) IV Push daily    MEDICATIONS  (PRN):

## 2024-02-11 NOTE — PROGRESS NOTE ADULT - SUBJECTIVE AND OBJECTIVE BOX
INTERVAL EVENTS:  No acute events overnight. CTH showed no intracranial bleed. Patient remains AOX1 in the am    PAST MEDICAL & SURGICAL HISTORY:  HTN (hypertension)    CAD (coronary artery disease)    BPH (benign prostatic hyperplasia)    HLD (hyperlipidemia)        MEDICATIONS  (STANDING):  ampicillin/sulbactam  IVPB 3 Gram(s) IV Intermittent every 6 hours  ampicillin/sulbactam  IVPB      atorvastatin 40 milliGRAM(s) Oral at bedtime  azithromycin  IVPB      azithromycin  IVPB 500 milliGRAM(s) IV Intermittent every 24 hours  dexMEDEtomidine Bolus 23 MICROGram(s) IV Bolus once  pantoprazole  Injectable 40 milliGRAM(s) IV Push daily    MEDICATIONS  (PRN):      PHYSICAL EXAM:  Vital Signs Last 24 Hrs  T(C): 36.4 (11 Feb 2024 00:00), Max: 36.4 (11 Feb 2024 00:00)  T(F): 97.6 (11 Feb 2024 00:00), Max: 97.6 (11 Feb 2024 00:00)  HR: 90 (11 Feb 2024 04:00) (85 - 92)  BP: 124/80 (11 Feb 2024 04:00) (120/71 - 131/75)  BP(mean): 99 (11 Feb 2024 04:00) (88 - 99)  RR: 34 (11 Feb 2024 04:00) (20 - 34)  SpO2: 95% (11 Feb 2024 04:00) (95% - 98%)    Parameters below as of 11 Feb 2024 00:00  Patient On (Oxygen Delivery Method): nasal cannula  O2 Flow (L/min): 2    General: Elderly male, appears confused , sats 97% on 3 L NC  HEENT: Anicteric sclera. Moist mucous membranes.   Cardiac: Regular rate and rhythm. No murmurs, rubs, or gallops.   Vascular: Symmetric radial pulses. Dorsalis pedis pulses palpable.   Respiratory: +anterior audible gurgling , no tachypnea/accessory muscle use   Neurologic: lethargic, confused, does not follow commands, L sided facial droop and L extremity weakness LUE >LLE +dysarthria, pointing towards ceiling on R side. +mild agitation present ?visual hallucination  Abdomen: Soft, no ntender. Audible bowel sounds.   Extremities: Warm with no edema. No cyanosis or clubbing.   Skin: Warm and dry. No rash.     LABS:                        10.9   9.72  )-----------( 79       ( 11 Feb 2024 05:13 )             32.6     02-11    141  |  104  |  40<H>  ----------------------------<  110<H>  4.2   |  18  |  1.6<H>    Ca    8.6      11 Feb 2024 05:13  Mg     2.1     02-11    TPro  6.1  /  Alb  3.8  /  TBili  1.8<H>  /  DBili  x   /  AST  45<H>  /  ALT  22  /  AlkPhos  62  02-10        PT/INR - ( 10 Feb 2024 05:00 )   PT: 16.30 sec;   INR: 1.42 ratio         PTT - ( 10 Feb 2024 09:40 )  PTT:45.8 sec  Urinalysis Basic - ( 11 Feb 2024 05:13 )    Color: x / Appearance: x / SG: x / pH: x  Gluc: 110 mg/dL / Ketone: x  / Bili: x / Urobili: x   Blood: x / Protein: x / Nitrite: x   Leuk Esterase: x / RBC: x / WBC x   Sq Epi: x / Non Sq Epi: x / Bacteria: x      I&O's Summary    10 Feb 2024 07:01  -  11 Feb 2024 07:00  --------------------------------------------------------  IN: 200 mL / OUT: 0 mL / NET: 200 mL

## 2024-02-11 NOTE — PROGRESS NOTE ADULT - ASSESSMENT
This is a 89yo M Admitted for ACS and fall. PMHx include former smoker, CAD with RCA stents, HTN, HLD, GERD. Neurovascular consulted initially for clearance to start AC however, CTH w/ ?right thin SDH. Pending additional w/u for encephalopathy. Repeated stability scan w/ Acute/Subacute Right Frontoparietal infarct correlating w/ symptoms of Left hemiparesis and right gaze preference. No hemorrhage on CTH. Current NIHSS 19, mRs 2-3. Clinical exam able to follow simple commands, right gaze preference, left hemiplegia. Unable to contact niece for any h/o stroke.     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   ACS      Recommendations:   - c/w Cardiac management and telemonitoring   - Start Q4 Neuro and Vitals assessment   - SBP Goal normotensive   - c/w Atorvastatin 40mg HS   - Get MRI brain non-rox  - please obtain CTA head and neck if no contraindications  - can initiate UFH gtt stroke protocol (keep pTT 50-70)   - IF severe HA or change in NIHSS while on UFH - STAT NCCTH   - can d/c ASA if no longer needed for stent while on Full AC   - ASA per cardio team This is a 89yo M Admitted for ACS and fall. PMHx include former smoker, CAD with RCA stents, HTN, HLD, GERD. Neurovascular consulted initially for clearance to start AC however, CTH w/ ?right thin SDH. Pending additional w/u for encephalopathy. Repeated stability scan w/ Acute/Subacute Right Frontoparietal infarct correlating w/ symptoms of Left hemiparesis and right gaze preference. No hemorrhage on CTH. Current NIHSS 19, mRs 2-3. Clinical exam able to follow simple commands, right gaze preference, left hemiplegia. Unable to contact niece for any h/o stroke.     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   ACS      Recommendations:   - c/w Cardiac management and telemonitoring   - Start Q4 Neuro and Vitals assessment   - SBP Goal normotensive   - c/w Atorvastatin 40mg HS   - Get MRI brain non-rox  - please obtain CTA head and neck if no contraindications  - can initiate UFH gtt (pTT 50-75) no bolus   - IF severe HA or change in NIHSS while on UFH - STAT NCCTH   - can d/c ASA if no longer needed for stent while on Full AC   - ASA per cardio team This is a 89yo M Admitted for ACS and fall. PMHx include former smoker, CAD with RCA stents, HTN, HLD, GERD. Neurovascular consulted initially for clearance to start AC however, CTH w/ ?right thin SDH. Pending additional w/u for encephalopathy. Repeated stability scan w/ Acute/Subacute Right Frontoparietal infarct correlating w/ symptoms of Left hemiparesis and right gaze preference. No hemorrhage on CTH. Current NIHSS 19, mRs 2-3. Clinical exam able to follow simple commands, right gaze preference, left hemiplegia. Unable to contact niece for any h/o stroke.     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   ACS      Recommendations:   - c/w Cardiac management and telemonitoring   - Start Q4 Neuro and Vitals assessment   - SBP Goal normotensive   - c/w Atorvastatin 40mg HS   - Get MRI brain non-rox  - TTE  - please obtain CTA head and neck if no contraindications  - can initiate UFH gtt (pTT 50-75) no bolus   - IF severe HA or change in NIHSS while on UFH - STAT NCCTH   - can d/c ASA if no longer needed for stent while on Full AC   - ASA per cardio team This is a 89yo M Admitted for ACS and fall. PMHx include former smoker, CAD with RCA stents, HTN, HLD, GERD. Neurovascular consulted initially for clearance to start AC however, CTH w/ ?right thin SDH. Pending additional w/u for encephalopathy. Repeated stability scan w/ Acute/Subacute Right Frontoparietal infarct correlating w/ symptoms of Left hemiparesis and right gaze preference. No hemorrhage on CTH. Current NIHSS 19, mRs 2-3. Clinical exam able to follow simple commands, right gaze preference, left hemiplegia. Unable to contact niece for any h/o stroke.     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   ACS      Recommendations:   - c/w Cardiac management and telemonitoring   - Start Q4 Neuro and Vitals assessment   - SBP Goal normotensive   - c/w Atorvastatin 40mg HS   - Get MRI brain non-rox  - TTE  - please obtain CTA head and neck if no contraindications  - can initiate UFH gtt (pTT 50-75) no bolus   - IF severe HA or change in NIHSS while on UFH - STAT NCCTH   - can d/c ASA if no longer needed for stent while on Full AC   - Get PT/OT  - ASA per cardio team This is a 89yo M Admitted for ACS and fall. PMHx include former smoker, CAD with RCA stents, HTN, HLD, GERD. Neurovascular consulted initially for clearance to start AC however, CTH w/ ?right thin SDH. Pending additional w/u for encephalopathy. Repeated stability scan w/ Acute/Subacute Right Frontoparietal infarct correlating w/ symptoms of Left hemiparesis and right gaze preference. No hemorrhage on CTH. Current NIHSS 19, mRs 2-3. Clinical exam able to follow simple commands, right gaze preference, left hemiplegia. Unable to contact niece for any h/o stroke.     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   ACS      Recommendations:   - c/w Cardiac management and telemonitoring   - Start Q4 NIHSS and Vitals assessment   - SBP Goal normotensive   - c/w Atorvastatin 40mg HS   - Get MRI brain non-rox - priority - if unable to get MRI brain then CTH 24hrs post starting UFH  - TTE  - please obtain CTA head and neck if no contraindications  - can initiate UFH gtt (pTT 50-75) no bolus   - IF severe HA or change in NIHSS while on UFH - STAT NCCTH   - can d/c ASA if no longer needed for stent while on Full AC   - Get PT/OT/SLP/Rehab  - ASA per cardio team

## 2024-02-11 NOTE — PROGRESS NOTE ADULT - SUBJECTIVE AND OBJECTIVE BOX
89 year old male, poor historian, former smoker, with PMHx of CAD with RCA stents, HTN, HLD, GERD, [place] who was BIBEMS for worsening non-radiating chest pain that started yesterday. Pt describes pain as sharp, midsternal, lasting for a few minutes at a time and self-resolves. Pt reports having intermittent chest pain for approximately 1 month but the past 2 days it has worsened, rating 8/10. He follows Dr. Martinez at [facility]. Pt currently chest pain free. Denies SOB, fevers/chills, N/V, Denies recent travel and recent illness.     In the ED: VS 98.3F, HR 75, /61, O2 sat 95% on room air. 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 Labs were significant for cr of 1.8 (baseline cr 1.0-1.1 in 10/2023), troponin 1409. CXR neg for cardiopulmonary disease. Pt received  mg x1, Plavix 300 mg x1, and was started on Heparin drip.     Hospital course c/b worsening left side weakness and AMS. CTH shows thin hyperdensity along the high right frontoparietal convexity could be artifact versus thin subdural hemorrhage. Recommend short-term follow-up CT head. No large acute territorial infarct. NCC consulting for escalation of care.    Physical Exam:  General: Visual hallucination, no acute distress  HEENT: Anicteric sclerae  Cardiac: [id number]  Lungs: Ronchi  Abdomen: Soft, non-tender, +BS  Extremities: No c/c/e  Skin/Incision Site: Clean, dry and intact  Neurologic: AAOx0, follows commands, PERRL, severe dysarthria, right gaze preference, slight right facial droop, spont in all four R>L, sensation intact      ICU Vital Signs Last 24 Hrs  T(C): 36.4 (11 Feb 2024 00:00), Max: 36.4 (11 Feb 2024 00:00)  T(F): 97.6 (11 Feb 2024 00:00), Max: 97.6 (11 Feb 2024 00:00)  HR: 90 (11 Feb 2024 04:00) (85 - 92)  BP: 124/80 (11 Feb 2024 04:00) (120/71 - 131/75)  BP(mean): 99 (11 Feb 2024 04:00) (88 - 99)  RR: 34 (11 Feb 2024 04:00) (20 - 34)  SpO2: 95% (11 Feb 2024 04:00) (95% - 98%)    O2 Parameters below as of 11 Feb 2024 00:00  Patient On (Oxygen Delivery Method): nasal cannula  O2 Flow (L/min): 2      10 Feb 2024 07:01  -  11 Feb 2024 07:00  --------------------------------------------------------  IN:    IV PiggyBack: 200 mL  Total IN: 200 mL    OUT:  Total OUT: 0 mL    Total NET: 200 mL          MEDICATIONS  (STANDING):  ampicillin/sulbactam  IVPB      ampicillin/sulbactam  IVPB 3 Gram(s) IV Intermittent every 6 hours  aspirin  chewable 81 milliGRAM(s) Oral daily  atorvastatin 40 milliGRAM(s) Oral at bedtime  azithromycin  IVPB      azithromycin  IVPB 500 milliGRAM(s) IV Intermittent every 24 hours  dexMEDEtomidine Bolus 23 MICROGram(s) IV Bolus once  pantoprazole  Injectable 40 milliGRAM(s) IV Push daily    MEDICATIONS  (PRN):    02-11    141  |  104  |  40<H>  ----------------------------<  110<H>  4.2   |  18  |  1.6<H>    Ca    8.6      11 Feb 2024 05:13  Mg     2.1     02-11    TPro  6.1  /  Alb  3.8  /  TBili  1.8<H>  /  DBili  x   /  AST  45<H>  /  ALT  22  /  AlkPhos  62  02-10      LABS:  Na: 135 (02-10 @ 05:00), 141 (02-09 @ 14:43)  K: 4.2 (02-10 @ 05:00), 5.1 (02-09 @ 14:43)  Cl: 103 (02-10 @ 05:00), 105 (02-09 @ 14:43)  CO2: 21 (02-10 @ 05:00), 22 (02-09 @ 14:43)  BUN: 35 (02-10 @ 05:00), 32 (02-09 @ 14:43)  Cr: 1.7 (02-10 @ 05:00), 1.8 (02-09 @ 14:43)  Glu: 103(02-10 @ 05:00), 97(02-09 @ 14:43)                          10.9   9.72  )-----------( 79       ( 11 Feb 2024 05:13 )             32.6       Hgb: 11.0 (02-10 @ 05:00), 10.6 (02-10 @ 01:20), 11.3 (02-09 @ 14:43)  Hct: 33.7 (02-10 @ 05:00), 31.8 (02-10 @ 01:20), 34.6 (02-09 @ 14:43)  WBC: 8.04 (02-10 @ 05:00), 6.10 (02-10 @ 01:20), 7.12 (02-09 @ 14:43)  Plt: 95 (02-10 @ 05:00), 80 (02-10 @ 01:20), 115 (02-09 @ 14:43)    INR: 1.42 02-10-24 @ 05:00, 1.38 02-09-24 @ 18:58  PTT: 45.8 02-10-24 @ 09:40, 107.2 02-10-24 @ 01:20, 31.5 02-09-24 @ 18:58          LIVER FUNCTIONS - ( 10 Feb 2024 05:00 )  Alb: 3.8 g/dL / Pro: 6.1 g/dL / ALK PHOS: 62 U/L / ALT: 22 U/L / AST: 45 U/L / GGT: x           ABG - ( 10 Feb 2024 05:40 )  pH, Arterial: 7.42  pH, Blood: x     /  pCO2: 34    /  pO2: 70    / HCO3: 22    / Base Excess: -1.7  /  SaO2: 95.9           CT Head No Cont (02.10.24 @ 20:57) >  ATTENDING ADDENDUM:    There is focal gray-white differentiation loss in the right parietal   lobe, series 2 image 19, series 601 image 19, [id number] image 74 with   mild underlying hypoattenuation most likely reflecting age indeterminate   infarct possibly acute to subacute and new since 2019. Correlate with   clinical symptoms and consider MRI follow-up.    No intracranial hemorrhage or masseffect.

## 2024-02-11 NOTE — PROGRESS NOTE ADULT - SUBJECTIVE AND OBJECTIVE BOX
Neurology Progress Note    Interval History:    Patient was seen and examined, no acute event over night.   Friends at bedside provided further hx. of patient falling while trying to get out the shower on friday which led to his arrival to  the hospital. Chest pain was ongoing for the past 2 weeks intermittently.     Medications:  ampicillin/sulbactam  IVPB      ampicillin/sulbactam  IVPB 3 Gram(s) IV Intermittent every 6 hours  aspirin  chewable 81 milliGRAM(s) Oral daily  atorvastatin 40 milliGRAM(s) Oral at bedtime  azithromycin  IVPB      azithromycin  IVPB 500 milliGRAM(s) IV Intermittent every 24 hours  dexMEDEtomidine Bolus 23 MICROGram(s) IV Bolus once  pantoprazole  Injectable 40 milliGRAM(s) IV Push daily      Vital Signs Last 24 Hrs  T(C): 36.4 (11 Feb 2024 00:00), Max: 36.4 (11 Feb 2024 00:00)  T(F): 97.6 (11 Feb 2024 00:00), Max: 97.6 (11 Feb 2024 00:00)  HR: 90 (11 Feb 2024 04:00) (85 - 92)  BP: 124/80 (11 Feb 2024 04:00) (120/71 - 131/75)  BP(mean): 99 (11 Feb 2024 04:00) (88 - 99)  RR: 34 (11 Feb 2024 04:00) (20 - 34)  SpO2: 95% (11 Feb 2024 04:00) (95% - 98%)    Parameters below as of 11 Feb 2024 00:00  Patient On (Oxygen Delivery Method): nasal cannula  O2 Flow (L/min): 2      Neurological Examination:  General:  Fatigue   Cognitive/Language:  Severely dysarthric. follows simple commands. Unable to identify orientation beyond self.     Cranial Nerves  - Eyes: Right gaze preference able to cross independently. LHH. able to look left and right w/o nystagmus. PERRL.  No ptosis  - Face:  No facial asymmetry.    - Ears/Nose/Throat:  [place] uses amplifier. Palate elevates midline.  Tongue and uvula midline.   Motor examination:  RUE 4/5; RLE 2/5; LUE/LLE occasional spontaneous movement. no tremors or myoclonic activity   Sensory examination: w/draw to noxious of the Right limbs and LLE but not the LUE   Reflexes:   2+ UE and 1+ LE. Plantar response upgoing left and downgoing right. clonus absent.  Cerebellum:   deferred   Gait deferred     Labs:  CBC Full  -  ( 11 Feb 2024 05:13 )  WBC Count : 9.72 K/uL  RBC Count : 3.42 M/uL  Hemoglobin : 10.9 g/dL  Hematocrit : 32.6 %  Platelet Count - Automated : 79 K/uL  Mean Cell Volume : 95.3 fL  Mean Cell Hemoglobin : 31.9 pg  Mean Cell Hemoglobin Concentration : 33.4 g/dL  Auto Neutrophil # : 8.14 K/uL  Auto Lymphocyte # : 0.81 K/uL  Auto Monocyte # : 0.72 K/uL  Auto Eosinophil # : 0.00 K/uL  Auto Basophil # : 0.03 K/uL  Auto Neutrophil % : 83.8 %  Auto Lymphocyte % : 8.3 %  Auto Monocyte % : 7.4 %  Auto Eosinophil % : 0.0 %  Auto Basophil % : 0.3 %    02-11    141  |  104  |  40<H>  ----------------------------<  110<H>  4.2   |  18  |  1.6<H>    Ca    8.6      11 Feb 2024 05:13  Mg     2.1     02-11    TPro  6.1  /  Alb  3.8  /  TBili  1.8<H>  /  DBili  x   /  AST  45<H>  /  ALT  22  /  AlkPhos  62  02-10    LIVER FUNCTIONS - ( 10 Feb 2024 05:00 )  Alb: 3.8 g/dL / Pro: 6.1 g/dL / ALK PHOS: 62 U/L / ALT: 22 U/L / AST: 45 U/L / GGT: x           PT/INR - ( 10 Feb 2024 05:00 )   PT: 16.30 sec;   INR: 1.42 ratio    PTT - ( 10 Feb 2024 09:40 )  PTT:45.8 sec          RADIOLOGY & ADDITIONAL TESTS:   Neurology Progress Note    Interval History:    Patient was seen and examined, no acute event over night.   Friends at bedside provided further hx. of patient falling while trying to get out the shower on friday which led to his arrival to  the hospital. Chest pain was ongoing for the past 2 weeks intermittently.     Medications:  ampicillin/sulbactam  IVPB      ampicillin/sulbactam  IVPB 3 Gram(s) IV Intermittent every 6 hours  aspirin  chewable 81 milliGRAM(s) Oral daily  atorvastatin 40 milliGRAM(s) Oral at bedtime  azithromycin  IVPB      azithromycin  IVPB 500 milliGRAM(s) IV Intermittent every 24 hours  dexMEDEtomidine Bolus 23 MICROGram(s) IV Bolus once  pantoprazole  Injectable 40 milliGRAM(s) IV Push daily      Vital Signs Last 24 Hrs  T(C): 36.4 (11 Feb 2024 00:00), Max: 36.4 (11 Feb 2024 00:00)  T(F): 97.6 (11 Feb 2024 00:00), Max: 97.6 (11 Feb 2024 00:00)  HR: 90 (11 Feb 2024 04:00) (85 - 92)  BP: 124/80 (11 Feb 2024 04:00) (120/71 - 131/75)  BP(mean): 99 (11 Feb 2024 04:00) (88 - 99)  RR: 34 (11 Feb 2024 04:00) (20 - 34)  SpO2: 95% (11 Feb 2024 04:00) (95% - 98%)    Parameters below as of 11 Feb 2024 00:00  Patient On (Oxygen Delivery Method): nasal cannula  O2 Flow (L/min): 2      Neurological Examination:  General:  Fatigue   Cognitive/Language:  Severely dysarthric. follows simple commands. Unable to identify orientation beyond self.     Cranial Nerves  - Eyes: Right gaze preference able to cross independently. LHH. able to look left and right w/o nystagmus. PERRL.  No ptosis  - Face:  No facial asymmetry.    - Ears/Nose/Throat:  [place] uses amplifier. Palate elevates midline.  Tongue and uvula midline.   Motor examination:  RUE 4/5; RLE 2/5; LUE/LLE occasional spontaneous movement. no tremors or myoclonic activity   Sensory examination: w/draw to noxious of the Right limbs and LLE but not the LUE   Reflexes:   2+ UE and 1+ LE. Plantar response upgoing left and downgoing right. clonus absent.  Cerebellum:   deferred   Gait deferred     Labs:  CBC Full  -  ( 11 Feb 2024 05:13 )  WBC Count : 9.72 K/uL  RBC Count : 3.42 M/uL  Hemoglobin : 10.9 g/dL  Hematocrit : 32.6 %  Platelet Count - Automated : 79 K/uL  Mean Cell Volume : 95.3 fL  Mean Cell Hemoglobin : 31.9 pg  Mean Cell Hemoglobin Concentration : 33.4 g/dL  Auto Neutrophil # : 8.14 K/uL  Auto Lymphocyte # : 0.81 K/uL  Auto Monocyte # : 0.72 K/uL  Auto Eosinophil # : 0.00 K/uL  Auto Basophil # : 0.03 K/uL  Auto Neutrophil % : 83.8 %  Auto Lymphocyte % : 8.3 %  Auto Monocyte % : 7.4 %  Auto Eosinophil % : 0.0 %  Auto Basophil % : 0.3 %    02-11    141  |  104  |  40<H>  ----------------------------<  110<H>  4.2   |  18  |  1.6<H>    Ca    8.6      11 Feb 2024 05:13  Mg     2.1     02-11    TPro  6.1  /  Alb  3.8  /  TBili  1.8<H>  /  DBili  x   /  AST  45<H>  /  ALT  22  /  AlkPhos  62  02-10    LIVER FUNCTIONS - ( 10 Feb 2024 05:00 )  Alb: 3.8 g/dL / Pro: 6.1 g/dL / ALK PHOS: 62 U/L / ALT: 22 U/L / AST: 45 U/L / GGT: x           PT/INR - ( 10 Feb 2024 05:00 )   PT: 16.30 sec;   INR: 1.42 ratio    PTT - ( 10 Feb 2024 09:40 )  PTT:45.8 sec          RADIOLOGY & ADDITIONAL TESTS:    CT Head No Cont (02.10.24 @ 20:57) >  The previous identified thin hypodensity along the high right frontal convexity is no longer visualized, likely representing streak artifact. No intracranial hemorrhage.    Remainder of exam is grossly unchanged since prior CT from earlier same day.    ATTENDING ADDENDUM:    There is focal gray-white differentiation loss in the right parietal lobe, series 2 image 19, series 601 image 19, [id number] image 74 with mild underlying hypoattenuation most likely reflecting age indeterminate   infarct possibly acute to subacute and new since 2019. Correlate with clinical symptoms and consider MRI follow-up. No intracranial hemorrhage or masseffect.

## 2024-02-11 NOTE — PROGRESS NOTE ADULT - ASSESSMENT
Assessment:    89 year old male, poor historian, former smoker, with PMHx of CAD with RCA stents(years ago at [facility]), HTN, HLD, GERD, [place] who was BIBEMS for worsening non-radiating chest pain, s/p unwitnessed fall 2/9 AM. Pt was admitted to cardiac telemetry for NSTEMI.  Pt was loaded with ASA/plavix, started on IV heparin drip, 2/10 AM pt noted to be more confused with dysarthria, L sided facial droop, L sided weakness UE>LE, +gurgling anterior chest /neck, sats 97% on 3 L NC. Patient not following commands, pointing towards R side ceiling. . Neurology called and CThead obtained showed possible small R SDH vs artifact. Last known normal .-->unknown.       Problems discussed and associated plan:  # s/p unwitnessed fall, r/o CVA vs metabolic encephalopathy , ?R small SDH, (though likely artifact)  - CT head 2/10 no bleeding, subacute vs. acute infart  - Monitor on telemetry  - f/u with neuro regarding further intervention.  - restart ASA  - fall/aspiration/seizure precautions    - Pt not alert enough to tolerate PO currently, NPO placed  - swallow eval when improved   - family meeting w nash Jones->pt DNR/DNI , does not wish to have aggresive interventions for the pt   - TTE pending   - frequent neuro checks  - Hemodynamically stable currently   - f/u neuro  recs   - r/o infectious/metabolic causes  - UA negative wbc, bacteria, nitrite/leuks     #NSTEMI  - As per nash pt has been complaining chest pain for long time  - Trop 1400-->1200-->1500-->1280  - IV heparin on hold as of AM, ASA restarted  - Increased atorvastatin 10 mg qd increased to 40mg -->but on hold as pt not candidate for PO now   - Obtain TTE   - Pt does not appear to be candidate for cardiac angiogram due to mental status    #Pneumonia ,possible aspiration PNA  - CXR Right basilar opacity  - started on IV Unsayn and Azithromycin  - monitor wbc and fever curve   - strict Aspiration precautions   - monitor respiratory status, currently sats 95% on 3 L NC  - consider nebs if needed     #HAYLEE  - Cr 1.8 on admission-->1.7 today  (baseline Cr 1.0-1.1 in Oct 2023)  - Monitor BMP   - avoid nephrotoxins   - pt has warren now, monitor strict Is and Os   - monitor Cr and lytes     #Thrombocytopenia   - chronic   - plts Oct 2023 110-->current   - monitor plts 95      #GERD  - on protonix , reduced dose to 20 mg IV due to Cr     #HLD  - Increased atorvastatin to 40 mg (from 10 mg) qhs   -  lipid profile LDL 50,Triglycerides 59    #DVT ppx/GI ppx  - IV protonix 40 mg daily     #GOC  - Niece Karen Elver  at bedside, [facility] discussion was done, family made aware of patient's poor prognosis. Karen reports  she is hcp, and wants DNR/DNI-->MOLST form signed     Please contact me with any questions or concerns at [phone].

## 2024-02-11 NOTE — PROGRESS NOTE ADULT - ASSESSMENT
· Assessment- Acute R parietal stroke  - Plan    - Gen neuro checks q 2 hrs  - continue ASA  - Stroke Core measures- Check HGBA1C , LDL, TSH and Free T4  - Atorvastatin   - Correct metabolic derangement  - Patient with high LACE score and currently DNR/DNI- patient was living with niece  ambulating without assistance , slowly progressive memory loss able to manage all of  his own ADL's occasionally prepared foods for himself.  - Suggest palliative consultation   - Will recommend neurology follow up . We were called 2/10/24 for ? SDH which was artifact and symptoms C/W acute acute.  -Will sign off. No NCC needs at this time. Thank you for consultation

## 2024-02-12 NOTE — CONSULT NOTE ADULT - SUBJECTIVE AND OBJECTIVE BOX
HPI:  89 year old male, poor historian, former smoker, with PMHx of CAD with RCA stents, HTN, HLD, GERD, [place] who was BIBEMS for worsening non-radiating chest pain that started yesterday. Pt describes pain as sharp, midsternal, lasting for a few minutes at a time and self-resolves. Pt reports having intermittent chest pain for approximately 1 month but the past 2 days it has worsened, rating 8/10. He follows Dr. Martinez at [facility]. Pt currently chest pain free. Denies SOB, fevers/chills, N/V, Denies recent travel and recent illness.     In the ED: VS 98.3F, HR 75, /61, O2 sat 95% on room air. 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 Labs were significant for cr of 1.8 (baseline cr 1.0-1.1 in 10/2023), troponin 1409. CXR neg for cardiopulmonary disease. Pt received  mg x1, Plavix 300 mg x1, and was started on Heparin drip.     Of note, attempted to call Karen mensah) multiple times for collateral information however, she did not respond.  (09 Feb 2024 18:46)    PERTINENT PM/SXH:   HTN (hypertension)    CAD (coronary artery disease)    BPH (benign prostatic hyperplasia)    HLD (hyperlipidemia)        FAMILY HISTORY:  No pertinent family history in first degree relatives      ITEMS NOT CHECKED ARE NOT PRESENT    SOCIAL HISTORY:   Significant other/partner[ ]  Children[ ]  Episcopalian/Spirituality:  Substance hx:  [ ]   Tobacco hx:  [ ]   Alcohol hx: [ ]   Living Situation: [ ]Home  [ ]Long term care  [ ]Rehab [ ]Other  Home Services: [ ] HHA [ ] Visting RN [ ] Hospice  Occupation:  Home Opioid hx:  [ ] Y [ ] N [ ] I-Stop Reference No:     ADVANCE DIRECTIVES:    [ ] Full Code [ ] DNR  MOLST  [ ]  Living Will  [ ]   DECISION MAKER(s):  [ ] Health Care Proxy(s)  [ ] Surrogate(s)  [ ] Guardian           Name(s): Phone Number(s):    BASELINE (I)ADL(s) (prior to admission):  [place]: [ ]Total  [ ] Moderate [ ]Dependent  Palliative Performance Status Version 2:         %    http://npcrc.org/files/news/palliative_performance_scale_ppsv2.pdf    Allergies    No Known Allergies    Intolerances    MEDICATIONS  (STANDING):  ampicillin/sulbactam  IVPB      ampicillin/sulbactam  IVPB 3 Gram(s) IV Intermittent every 6 hours  azithromycin  IVPB 500 milliGRAM(s) IV Intermittent every 24 hours  azithromycin  IVPB      pantoprazole  Injectable 40 milliGRAM(s) IV Push daily    MEDICATIONS  (PRN):      PRESENT SYMPTOMS: [ ]Unable to obtain due to poor mentation   Source if other than patient:  [ ]Family   [ ]Team     Pain: [ ]yes [ ]no  QOL impact -   Location -                    Aggravating factors -  Alleviating factors -   Quality -  Radiation -  Timing -   Severity (0-10 scale):  Minimal acceptable level (0-10 scale):     CPOT:    https://www.[facility].org/getattachment/[email or web address]/Critical-Care-Pain-Observation-Tool-(CPOT)    PAIN AD Score:   http://geriatrictoolkit.[facility]/cog/painad.pdf     Dyspnea:                           [ ]None[ ]Mild [ ]Moderate [ ]Severe     Respiratory Distress Observation Scale (RDOS):   A score of 0 to 2 signifies little or no respiratory distress, 3 signifies mild distress, scores 4 to 6 indicate moderate distress, and scores greater than 7 signify severe distress  https://www.[facility].ca/sites/default/files/PDFS/043980-vnqppazbnzg-ytvunmyq-lupapcqaoda-nplbs.pdf    Anxiety:                             [ ]None[ ]Mild [ ]Moderate [ ]Severe   Fatigue:                             [ ]None[ ]Mild [ ]Moderate [ ]Severe   Nausea:                             [ ]None[ ]Mild [ ]Moderate [ ]Severe   Loss of appetite:              [ ]None[ ]Mild [ ]Moderate [ ]Severe   Constipation:                    [ ]None[ ]Mild [ ]Moderate [ ]Severe    Other Symptoms:  [ ]All other review of systems negative     Palliative Performance Status Version 2:         %    http://npcrc.org/files/news/palliative_performance_scale_ppsv2.pdf  PHYSICAL EXAM:  Vital Signs Last 24 Hrs  T(C): 36.5 (12 Feb 2024 07:26), Max: 37.4 (11 Feb 2024 15:35)  T(F): 97.7 (12 Feb 2024 07:26), Max: 99.3 (11 Feb 2024 15:35)  HR: 89 (12 Feb 2024 07:26) (76 - 89)  BP: 135/74 (12 Feb 2024 07:26) (124/66 - 138/81)  BP(mean): 99 (12 Feb 2024 07:26) (89 - 99)  RR: 21 (12 Feb 2024 07:26) (18 - 21)  SpO2: 97% (12 Feb 2024 07:26) (96% - 98%)    Parameters below as of 12 Feb 2024 04:00  Patient On (Oxygen Delivery Method): nasal cannula  O2 Flow (L/min): 2   I&O's Summary    11 Feb 2024 07:01  -  12 Feb 2024 07:00  --------------------------------------------------------  IN: 200 mL / OUT: 0 mL / NET: 200 mL        GENERAL:  NAD   PULMONARY:  Non labored breathing  NEUROLOGIC: Grossly intact  BEHAVIORAL/PSYCH:  Calm.     CRITICAL CARE:  [ ] Shock Present  [ ]Septic [ ]Cardiogenic [ ]Neurologic [ ]Hypovolemic  [ ]  Vasopressors [ ]  Inotropes   [ ]Respiratory failure present [ ]Mechanical ventilation [ ]Non-invasive ventilatory support [ ]High flow  [ ]Acute  [ ]Chronic [ ]Hypoxic  [ ]Hypercarbic [ ]Other  [ ]Other organ failure     LABS: I have reviewed daily labs                        10.9   9.72  )-----------( 79       ( 11 Feb 2024 05:13 )             32.6   02-11    141  |  104  |  40<H>  ----------------------------<  110<H>  4.2   |  18  |  1.6<H>    Ca    8.6      11 Feb 2024 05:13  Mg     2.1     02-11        RADIOLOGY & ADDITIONAL STUDIES: I have reviewed new imaging    PROTEIN CALORIE MALNUTRITION PRESENT: [ ]mild [ ]moderate [ ]severe [ ]underweight [ ]morbid obesity  https://www.andeal.org/vault/2440/web/files/ONC/Table_Clinical%20Characteristics%20to%20Document%20Malnutrition-White%20JV%20et%20al%202012.pdf    Height (cm): 167.6 (02-09-24 @ 20:49), 165.1 (11-10-23 @ 05:41), 165.1 (10-26-23 @ 20:34)  Weight (kg): 77.1 (02-09-24 @ 12:12), 59 (11-10-23 @ 05:41), 61.2 (10-26-23 @ 20:34)  BMI (kg/m2): 27.4 (02-09-24 @ 20:49), 28.3 (02-09-24 @ 12:12), 21.6 (11-10-23 @ 05:41)    [ ]PPSV2 < or = to 30% [ ]significant weight loss  [ ]poor nutritional intake  [ ]anasarca      [ ]Artificial Nutrition      Palliative Care Spiritual/Emotional Screening Tool Question  Severity (0-4):                    OR                    [ x] Unable to determine. Will assess at later time if appropriate.  Score of 2 or greater indicates recommendation of Chaplaincy and/or SW referral  Chaplaincy Referral: [ ] Yes [ ] Refused [ ] Following     Caregiver [place]:  [ ] Yes [ ] No    OR    [x ] Unable to determine. Will assess at later time if appropriate.  Social Work Referral [ ]  Patient and Family Centered Care Referral [ ]    Anticipatory Grief Present: [ ] Yes [ ] No    OR     [ x] Unable to determine. Will assess at later time if appropriate.  Social Work Referral [ ]  Patient and Family Centered Care Referral [ ]    REFERRALS:   [ ]Chaplaincy  [ ]Hospice  [ ]Child Life  [ ]Social Work  [ ]Case management [ ]Holistic Therapy     Palliative care education provided to patient and/or family HPI:  89 year old male, poor historian, former smoker, with PMHx of CAD with RCA stents, HTN, HLD, GERD, [place] who was BIBEMS for worsening non-radiating chest pain that started yesterday. Pt describes pain as sharp, midsternal, lasting for a few minutes at a time and self-resolves. Pt reports having intermittent chest pain for approximately 1 month but the past 2 days it has worsened, rating 8/10. He follows Dr. Martinez at [facility]. Pt currently chest pain free. Denies SOB, fevers/chills, N/V, Denies recent travel and recent illness.     In the ED: VS 98.3F, HR 75, /61, O2 sat 95% on room air. 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 Labs were significant for cr of 1.8 (baseline cr 1.0-1.1 in 10/2023), troponin 1409. CXR neg for cardiopulmonary disease. Pt received  mg x1, Plavix 300 mg x1, and was started on Heparin drip.     Of note, attempted to call Karen mensah) multiple times for collateral information however, she did not respond.  (09 Feb 2024 18:46)    Patient does not respond to verbal stimuli.   No family at bedside.     PERTINENT PM/SXH:   HTN (hypertension)    CAD (coronary artery disease)    BPH (benign prostatic hyperplasia)    HLD (hyperlipidemia)        FAMILY HISTORY:  No pertinent family history in first degree relatives      ITEMS NOT CHECKED ARE NOT PRESENT    SOCIAL HISTORY:   Significant other/partner[ ]  Children[ ]  Christian/Spirituality:  Substance hx:  [ ]   Tobacco hx:  [ ]   Alcohol hx: [ ]   Living Situation: [ ]Home  [ ]Long term care  [ ]Rehab [ ]Other  Home Services: [ ] HHA [ ] Visting RN [ ] Hospice  Occupation:  Home Opioid hx:  [ ] Y [ ] N [ ] I-Stop Reference No:     ADVANCE DIRECTIVES:    [ ] Full Code [ ] DNR  MOLST  [ ]  Living Will  [ ]   DECISION MAKER(s):  [x ] Health Care Proxy(s)  [ ] Surrogate(s)  [ ] Guardian           Name(s):   Karen (niece)    BASELINE (I)ADL(s) (prior to admission):  [place]: [ ]Total  [ ] Moderate [ ]Dependent  Palliative Performance Status Version 2:         %    http://npcrc.org/files/news/palliative_performance_scale_ppsv2.pdf    Allergies    No Known Allergies    Intolerances    MEDICATIONS  (STANDING):  ampicillin/sulbactam  IVPB      ampicillin/sulbactam  IVPB 3 Gram(s) IV Intermittent every 6 hours  azithromycin  IVPB 500 milliGRAM(s) IV Intermittent every 24 hours  azithromycin  IVPB      pantoprazole  Injectable 40 milliGRAM(s) IV Push daily    MEDICATIONS  (PRN):      PRESENT SYMPTOMS: [x ]Unable to obtain due to poor mentation   Source if other than patient:  [ ]Family   [ ]Team     Pain: [ ]yes [ ]no  QOL impact -   Location -                    Aggravating factors -  Alleviating factors -   Quality -  Radiation -  Timing -   Severity (0-10 scale):  Minimal acceptable level (0-10 scale):     CPOT:    https://www.[facility].org/getattachment/[email or web address]/Critical-Care-Pain-Observation-Tool-(CPOT)    PAIN AD Score: 0  http://geriatrictoolkit.[facility]/cog/painad.pdf     Dyspnea:                           [ ]None[ ]Mild [ ]Moderate [ ]Severe     Respiratory Distress Observation Scale (RDOS): 2  A score of 0 to 2 signifies little or no respiratory distress, 3 signifies mild distress, scores 4 to 6 indicate moderate distress, and scores greater than 7 signify severe distress  https://www.[facility].ca/sites/default/files/PDFS/587122-hlmxwraasyx-wqhlmvxq-mmzovjztndw-omrjt.pdf    Anxiety:                             [ ]None[ ]Mild [ ]Moderate [ ]Severe   Fatigue:                             [ ]None[ ]Mild [ ]Moderate [ ]Severe   Nausea:                             [ ]None[ ]Mild [ ]Moderate [ ]Severe   Loss of appetite:              [ ]None[ ]Mild [ ]Moderate [ ]Severe   Constipation:                    [ ]None[ ]Mild [ ]Moderate [ ]Severe    Other Symptoms:  [ ]All other review of systems negative     Palliative Performance Status Version 2:         %    http://[facility].org/files/news/palliative_performance_scale_ppsv2.pdf  PHYSICAL EXAM:  Vital Signs Last 24 Hrs  T(C): 36.5 (12 Feb 2024 07:26), Max: 37.4 (11 Feb 2024 15:35)  T(F): 97.7 (12 Feb 2024 07:26), Max: 99.3 (11 Feb 2024 15:35)  HR: 89 (12 Feb 2024 07:26) (76 - 89)  BP: 135/74 (12 Feb 2024 07:26) (124/66 - 138/81)  BP(mean): 99 (12 Feb 2024 07:26) (89 - 99)  RR: 21 (12 Feb 2024 07:26) (18 - 21)  SpO2: 97% (12 Feb 2024 07:26) (96% - 98%)    Parameters below as of 12 Feb 2024 04:00  Patient On (Oxygen Delivery Method): nasal cannula  O2 Flow (L/min): 2   I&O's Summary    11 Feb 2024 07:01  -  12 Feb 2024 07:00  --------------------------------------------------------  IN: 200 mL / OUT: 0 mL / NET: 200 mL        GENERAL:  NAD   PULMONARY:  Non labored breathing  NEUROLOGIC: Does not respond to verbal stimuli  BEHAVIORAL/PSYCH:  Calm.     CRITICAL CARE:  [ ] Shock Present  [ ]Septic [ ]Cardiogenic [ ]Neurologic [ ]Hypovolemic  [ ]  Vasopressors [ ]  Inotropes   [ ]Respiratory failure present [ ]Mechanical ventilation [ ]Non-invasive ventilatory support [ ]High flow  [ ]Acute  [ ]Chronic [ ]Hypoxic  [ ]Hypercarbic [ ]Other  [ ]Other organ failure     LABS: I have reviewed daily labs                        10.9   9.72  )-----------( 79       ( 11 Feb 2024 05:13 )             32.6   02-11    141  |  104  |  40<H>  ----------------------------<  110<H>  4.2   |  18  |  1.6<H>    Ca    8.6      11 Feb 2024 05:13  Mg     2.1     02-11        RADIOLOGY & ADDITIONAL STUDIES: I have reviewed new imaging    PROTEIN CALORIE MALNUTRITION PRESENT: [ ]mild [ ]moderate [ ]severe [ ]underweight [ ]morbid obesity  https://www.andeal.org/vault/2440/web/files/ONC/Table_Clinical%20Characteristics%20to%20Document%20Malnutrition-White%20JV%20et%20al%202012.pdf    Height (cm): 167.6 (02-09-24 @ 20:49), 165.1 (11-10-23 @ 05:41), 165.1 (10-26-23 @ 20:34)  Weight (kg): 77.1 (02-09-24 @ 12:12), 59 (11-10-23 @ 05:41), 61.2 (10-26-23 @ 20:34)  BMI (kg/m2): 27.4 (02-09-24 @ 20:49), 28.3 (02-09-24 @ 12:12), 21.6 (11-10-23 @ 05:41)    [ ]PPSV2 < or = to 30% [ ]significant weight loss  [ ]poor nutritional intake  [ ]anasarca      [ ]Artificial Nutrition      Palliative Care Spiritual/Emotional Screening Tool Question  Severity (0-4):                    OR                    [ x] Unable to determine. Will assess at later time if appropriate.  Score of 2 or greater indicates recommendation of Chaplaincy and/or SW referral  Chaplaincy Referral: [ ] Yes [ ] Refused [ ] Following     Caregiver [place]:  [ ] Yes [ ] No    OR    [x ] Unable to determine. Will assess at later time if appropriate.  Social Work Referral [ ]  Patient and Family Centered Care Referral [ ]    Anticipatory Grief Present: [ ] Yes [ ] No    OR     [ x] Unable to determine. Will assess at later time if appropriate.  Social Work Referral [ ]  Patient and Family Centered Care Referral [ ]    REFERRALS:   [ ]Chaplaincy  [ ]Hospice  [ ]Child Life  [ ]Social Work  [ ]Case management [ ]Holistic Therapy     Palliative care education provided to patient and/or family

## 2024-02-12 NOTE — PROGRESS NOTE ADULT - ASSESSMENT
89 year old male, poor historian, former smoker, with PMHx of CAD with RCA stents(years ago at [facility]), HTN, HLD, GERD, [place] who was BIBEMS for worsening non-radiating chest pain, s/p unwitnessed fall 2/9 AM. Pt was admitted to cardiac telemetry for NSTEMI.  Pt was loaded with ASA/plavix, started on IV heparin drip, 2/10 AM pt noted to be more confused with dysarthria, L sided facial droop, L sided weakness UE>LE, +gurgling anterior chest /neck, sats 97% on 3 L NC. Patient not following commands, pointing towards R side ceiling. . Neurology called and CThead obtained showed possible small R SDH vs artifact. Last known normal .-->unknown.       Problems discussed and associated plan:  # s/p unwitnessed fall, r/o CVA vs metabolic encephalopathy , ?R small SDH, (though likely artifact)  - CT head 2/10 no bleeding, subacute vs. acute infarct  - Monitor on telemetry  - fall/aspiration/seizure precautions    - Pt not alert enough to tolerate PO currently, NPO placed  - swallow eval when improved   - family meeting w nash Jones->pt DNR/DNI , does not wish to have aggressive interventions for the pt   - TTE pending   - frequent neuro checks  - Hemodynamically stable currently   - f/u neuro  recs   -Neuro rec MRI brain non-rox - priority - if unable to get MRI brain then CTH 24hrs post starting UFH    #NSTEMI  - As per nash pt has been complaining chest pain for long time  - Trop 1400-->1200-->1500-->1280  - IV heparin on hold as of AM, ASA restarted  - Increased atorvastatin 10 mg qd increased to 40mg -->but on hold as pt not candidate for PO now   - Obtain TTE   - Pt does not appear to be candidate for cardiac angiogram due to mental status    #Pneumonia, possible aspiration PNA  - CXR Right basilar opacity  - started on IV Unsayn and Azithromycin  - labs stopped as patient is comfort measures   - strict Aspiration precautions   - monitor respiratory status, currently sats 95% on 3 L NC  - consider nebs if needed     #HAYLEE  - Cr 1.8 on admission-->1.7 today  (baseline Cr 1.0-1.1 in Oct 2023)  - Monitor BMP   - avoid nephrotoxins   - pt has warren now, monitor strict Is and Os     #Thrombocytopenia   - chronic   - plts Oct 2023 110-->current 79    #GERD  - on protonix 40 mg IV     #HLD  - Increased atorvastatin to 40 mg (from 10 mg) qhs   -  lipid profile LDL 50, Triglycerides 59    #DVT ppx/GI ppx  - IV protonix 40 mg daily     #GOC  - Niece Karen Elver  at bedside, GOC discussion was done, family made aware of patient's poor prognosis. Karen reports  she is hcp, and wants DNR/DNI-->MOLST form signed     Please contact me with any questions or concerns at [phone].       89 year old male, poor historian, former smoker, with PMHx of CAD with RCA stents(years ago at [facility]), HTN, HLD, GERD, [place] who was BIBEMS for worsening non-radiating chest pain, s/p unwitnessed fall 2/9 AM. Pt was admitted to cardiac telemetry for NSTEMI.  Pt was loaded with ASA/plavix, started on IV heparin drip, 2/10 AM pt noted to be more confused with dysarthria, L sided facial droop, L sided weakness UE>LE, +gurgling anterior chest /neck, sats 97% on 3 L NC. Patient not following commands, pointing towards R side ceiling. Neurology called and CThead obtained, showed subacute vs acute infarct, patient currently on comfort measures per hcp (karen cao).       Problems discussed and associated plan:  # s/p unwitnessed fall, r/o CVA vs metabolic encephalopathy , ?R small SDH, (though likely artifact)  - CT head 2/10 no bleeding, subacute vs. acute infarct  - Monitor on telemetry  - fall/aspiration/seizure precautions    - Pt not alert enough to tolerate PO currently, NPO   - family meeting w nash Jones->pt DNR/DNI , does not wish to have aggressive interventions for the pt at this time  - TTE pending   - frequent neuro checks  -Hemodynamically stable currently   -Neuro rec MRI brain non-rox - priority - if unable to get MRI brain then CTH 24hrs post starting UFH    #NSTEMI  - As per nash pt has been complaining chest pain for long time  - Trop 1400-->1200-->1500-->1280  - IV heparin on hold   - Increased atorvastatin 10 mg qd increased to 40mg -->but on hold as pt not candidate for PO now   - Obtain TTE   - Pt does not appear to be candidate for cardiac angiogram due to mental status    #Pneumonia, possible aspiration PNA  - CXR Right basilar opacity  - started on IV Unsayn and Azithromycin  - labs stopped as patient is comfort measures   - strict Aspiration precautions   - monitor respiratory status, currently sats 95% on 3 L NC  - consider nebs if needed   -patient is NPO given mental status     #HAYLEE  - Cr 1.8 on admission-->1.7 today  (baseline Cr 1.0-1.1 in Oct 2023)  - avoid nephrotoxins   - pt has warren now, monitor strict Is and Os     #Thrombocytopenia   - chronic   - plts Oct 2023 110-->current 79    #GERD  - on protonix 40 mg IV     #HLD  - Increased atorvastatin to 40 mg (from 10 mg) qhs   -  lipid profile LDL 50, Triglycerides 59    #DVT ppx/GI ppx  - IV protonix 40 mg daily   -comfort measures     #GOC  - Niece Karen Raya  at bedside, GOC discussion was done, family made aware of patient's poor prognosis. Karen reports  she is hcp, and wants DNR/DNI-->MOLST form signed   -SW on board, hospice consult pending    Please contact me with any questions or concerns at [phone].       89 year old male, poor historian, former smoker, with PMHx of CAD with RCA stents(years ago at [facility]), HTN, HLD, GERD, [place] who was BIBEMS for worsening non-radiating chest pain, s/p unwitnessed fall 2/9 AM. Pt was admitted to cardiac telemetry for NSTEMI.  Pt was loaded with ASA/plavix, started on IV heparin drip, 2/10 AM pt noted to be more confused with dysarthria, L sided facial droop, L sided weakness UE>LE, +gurgling anterior chest /neck, sats 97% on 3 L NC. Patient not following commands, pointing towards R side ceiling. Neurology called and CThead obtained, showed subacute vs acute infarct, patient currently on comfort measures per hcp (karen cao).       Problems discussed and associated plan:  # s/p unwitnessed fall, r/o CVA vs metabolic encephalopathy , ?R small SDH, (though likely artifact)  - CT head 2/10 no bleeding, subacute vs. acute infarct  - Monitor on telemetry  - fall/aspiration/seizure precautions    - Pt not alert enough to tolerate PO currently, NPO   - family meeting w nash Jones->pt DNR/DNI , does not wish to have aggressive interventions for the pt at this time  - TTE pending   - frequent neuro checks  -Hemodynamically stable currently   -Neuro rec MRI brain non-rox - priority - if unable to get MRI brain then CTH 24hrs post starting UFH    #NSTEMI  - As per nash pt has been complaining chest pain for long time  - Trop 1400-->1200-->1500-->1280  - IV heparin on hold   - Increased atorvastatin 10 mg qd increased to 40mg -->but on hold as pt not candidate for PO now   - TTE pending   - Pt does not appear to be candidate for cardiac angiogram due to mental status    #Pneumonia, possible aspiration PNA  - CXR Right basilar opacity  - started on IV Unsayn and Azithromycin  - labs stopped as patient is comfort measures   - strict Aspiration precautions   - monitor respiratory status, currently sats 95% on 3 L NC  - consider nebs if needed   -patient is NPO given mental status     #HAYLEE  - Cr 1.8 on admission-->1.7 today  (baseline Cr 1.0-1.1 in Oct 2023)  - avoid nephrotoxins   - pt has warren now, monitor strict Is and Os     #Thrombocytopenia   - chronic   - plts Oct 2023 110-->current 79    #GERD  - on protonix 40 mg IV     #HLD  - Increased atorvastatin to 40 mg (from 10 mg) qhs   -  lipid profile LDL 50, Triglycerides 59    #DVT ppx/GI ppx  - IV protonix 40 mg daily   -comfort measures     #GOC  - Niece Karen Elver  at bedside, GOC discussion was done, family made aware of patient's poor prognosis. Karen reports  she is hcp, and wants DNR/DNI-->MOLST form signed   -SW on board, hospice consult pending    Please contact me with any questions or concerns at [phone].       89 year old male, poor historian, former smoker, with PMHx of CAD with RCA stents(years ago at [facility]), HTN, HLD, GERD, [place] who was BIBEMS for worsening non-radiating chest pain, s/p unwitnessed fall 2/9 AM. Pt was admitted to cardiac telemetry for NSTEMI.  Pt was loaded with ASA/plavix, started on IV heparin drip, 2/10 AM pt noted to be more confused with dysarthria, L sided facial droop, L sided weakness UE>LE, +gurgling anterior chest /neck, sats 97% on 3 L NC. Patient not following commands, pointing towards R side ceiling. Neurology called and CThead obtained, showed subacute vs acute infarct, patient currently on comfort measures per hcp (karen cao).       Problems discussed and associated plan:  # s/p unwitnessed fall, r/o CVA vs metabolic encephalopathy , ?R small SDH, (though likely artifact)  - CT head 2/10 no bleeding, subacute vs. acute infarct  - Monitor on telemetry  - fall/aspiration/seizure precautions    - Pt not alert enough to tolerate PO currently, NPO   - family meeting w nash Jones->pt DNR/DNI , does not wish to have aggressive interventions for the pt at this time  - TTE pending   - frequent neuro checks  -Hemodynamically stable currently   -Neuro rec MRI brain vs CTH 24hrs post starting UFH, family declining as patient is comfort measures at this time     #NSTEMI  - As per nash pt has been complaining chest pain for long time  - Trop 1400-->1200-->1500-->1280  - IV heparin on hold   - Increased atorvastatin 10 mg qd increased to 40mg -->but on hold as pt not candidate for PO now   - TTE pending   - Pt does not appear to be candidate for cardiac angiogram due to mental status    #Pneumonia, possible aspiration PNA  - CXR Right basilar opacity  - started on IV Unsayn and Azithromycin  - labs stopped as patient is comfort measures   - strict Aspiration precautions   - monitor respiratory status, currently sats 95% on 3 L NC  - consider nebs if needed   -patient is NPO given mental status     #HAYLEE  - Cr 1.8 on admission-->1.7 today  (baseline Cr 1.0-1.1 in Oct 2023)  - avoid nephrotoxins   - pt has warren now, monitor strict Is and Os     #Thrombocytopenia   - chronic   - plts Oct 2023 110-->current 79    #GERD  - on protonix 40 mg IV     #HLD  - Increased atorvastatin to 40 mg (from 10 mg) qhs   -  lipid profile LDL 50, Triglycerides 59    #DVT ppx/GI ppx  - IV protonix 40 mg daily   -comfort measures     #GOC  - Niece Karen Raya  at bedside, GOC discussion was done, family made aware of patient's poor prognosis. Karen reports  she is hcp, and wants DNR/DNI-->MOLST form signed   -SW on board, hospice consult pending    Please contact me with any questions or concerns at [phone].       89 year old male, poor historian, former smoker, with PMHx of CAD with RCA stents(years ago at [facility]), HTN, HLD, GERD, [place] who was BIBEMS for worsening non-radiating chest pain, s/p unwitnessed fall 2/9 AM. Pt was admitted to cardiac telemetry for NSTEMI.  Pt was loaded with ASA/plavix, started on IV heparin drip, 2/10 AM pt noted to be more confused with dysarthria, L sided facial droop, L sided weakness UE>LE, +gurgling anterior chest /neck, sats 97% on 3 L NC. Patient not following commands, pointing towards R side ceiling. Neurology called and CThead obtained, showed subacute vs acute infarct, patient currently on comfort measures per hcp (karen cao).       Problems discussed and associated plan:  # Acute CVA  - Monitor on telemetry  - fall/aspiration/seizure precautions    - Pt not alert enough to tolerate PO currently, NPO   - family meeting w nash Jones->pt DNR/DNI , does not wish to have aggressive interventions for the pt at this time      #NSTEMI  - As per nash pt has been complaining chest pain for long time  - Trop 1400-->1200-->1500-->1280  - Increased atorvastatin 10 mg qd increased to 40mg -->but on hold as pt not candidate for PO now   - F/U Echo results  - Pt does not appear to be candidate for cardiac angiogram due to mental status. Will treat conservatively     #Pneumonia, possible aspiration PNA  - CXR Right basilar opacity  - started on IV Unsayn and Azithromycin  - labs stopped as patient is comfort measures   - strict Aspiration precautions   - monitor respiratory status, currently sats 95% on 3 L NC  - consider nebs if needed   -patient is NPO given mental status     #HAYLEE  - Cr 1.8 on admission-->1.7 today  (baseline Cr 1.0-1.1 in Oct 2023)  - avoid nephrotoxins   - pt has warren now, monitor strict Is and Os     #Thrombocytopenia   - chronic   - plts Oct 2023 110-->current 79    #GERD  - on protonix 40 mg IV     #HLD  - Increased atorvastatin to 40 mg (from 10 mg) qhs   -  lipid profile LDL 50, Triglycerides 59    #DVT ppx/GI ppx  - IV protonix 40 mg daily   -comfort measures     #GOC  - Nash Karen Elver  at bedside, GOC discussion was done, family made aware of patient's poor prognosis. Karen reports  she is hcp, and wants DNR/DNI-->MOLST form signed   -SW on board, hospice consult pending    Please contact me with any questions or concerns at [phone].

## 2024-02-12 NOTE — PROGRESS NOTE ADULT - TIME BILLING
Interviewed and examined patient. Reviewed hospital course, ECG, tele, lab work, and medications.
Review of imaging and chart; obtaining history; examination of pt; discussion and coordination of care.
Discussion with HCP, ACP   Coordination with palliative care.

## 2024-02-12 NOTE — CONSULT NOTE ADULT - ASSESSMENT
89 year old male, poor historian, former smoker, with PMHx of CAD with RCA stents, HTN, HLD, GERD, [place] who was BIBEMS for worsening non-radiating chest pain. Patient found to have new stroke. Per chart, patient's family has decided on no invasive interventions and would like to focus on comfort.     I called patient's HCP, Karen, several times today to discuss plan of care, but she did not answer.     Plan:  - can consider IV dilaudid 0.2mg q3h PRN for pain/dyspnea/tachypnea  - will continue attempts to discuss plan of care with HCP  - DNR/DNI    Palliative care will continue to follow.   Please call [phone] with questions or concerns 24/7.   _____________  Anil Jones MD  Palliative Medicine  [facility]   of Geriatric and Palliative Medicine  [phone]

## 2024-02-12 NOTE — PROGRESS NOTE ADULT - SUBJECTIVE AND OBJECTIVE BOX
Chief complaint: Patient is a 89y old  Male who presents with a chief complaint of chest pain (10 Feb 2024 14:18)    Interval history: Patient was seen and evaluated at bedside, sleeping, not able to follow verbal commands. Niece at bedside.    Review of systems: A complete 10-point review of systems was obtained and is negative except as stated in the interval history.    Vitals:  ICU Vital Signs Last 24 Hrs  T(C): 36.5 (12 Feb 2024 07:26), Max: 37.4 (11 Feb 2024 15:35)  T(F): 97.7 (12 Feb 2024 07:26), Max: 99.3 (11 Feb 2024 15:35)  HR: 89 (12 Feb 2024 07:26) (76 - 89)  BP: 135/74 (12 Feb 2024 07:26) (124/66 - 138/81)  BP(mean): 99 (12 Feb 2024 07:26) (89 - 99)    RR: 21 (12 Feb 2024 07:26) (18 - 21)  SpO2: 97% (12 Feb 2024 07:26) (96% - 98%)    O2 Parameters below as of 12 Feb 2024 04:00  Patient On (Oxygen Delivery Method): nasal cannula  O2 Flow (L/min): 2    Physical exam:  General: Elderly male, on NC, appears confused  HEENT: Anicteric sclera. Moist mucous membranes.   Cardiac: Regular rate and rhythm. No murmurs, rubs, or gallops.   Vascular: Symmetric radial pulses. Dorsalis pedis pulses palpable.   Respiratory: +anterior audible gurgling, no tachypnea/accessory muscle use   Neurologic: lethargic, confused, does not follow commands  Abdomen: Soft, non tender. Audible bowel sounds.   Extremities: Warm with no edema. No cyanosis or clubbing.   Skin: Warm and dry. No rash.       Data reviewed:  - Telemetry: NSR 74 bpm   - ECG  < from: 12 Lead ECG (02.09.24 @ 12:17)   Ventricular Rate 73 BPM    QRS Duration 126 ms    Q-T Interval 418 ms    QTC Calculation(Bazett) 460 ms    R Axis -87 degrees    T Axis 63 degrees    Diagnosis Line Sinus rhythm with first degree AV block and premature atrial complexes  Left axis deviation  Non-specific intra-ventricular conduction block  Minimal voltage criteria for LVH, may be normal variant ( [place] product )  Cannot rule out Septal infarct  Abnormal ECG  Confirmed by Rob Tillman ([id number]) on 2/9/2024 12:37:19 PM  - Chest x-ray < from: Xray Chest 1 View- PORTABLE-Urgent (Xray Chest 1 View- PORTABLE-Urgent .) (02.10.24 @ 06:30) >  Impression: Right basilar opacity.   CT Head No Cont (02.10.24 @ 10:06) >  IMPRESSION:    Motion degraded exam with streak artifacts. Thin hyperdensity along the   high right frontoparietal convexity could be artifact versus thin   subdural hemorrhage. Recommend short-term follow-up CT head. No large   acute territorial infarct.     Xray Pelvis AP only (02.09.24 @ 15:16) >  IMPRESSION:    Bones are osteopenic.    No definite evidence of acute displaced fracture or dislocation.    Lumbosacral spine, sacroiliac joint, pubic symphysis and bilateral hip   degenerative changes.    - Labs:                        10.9   9.72  )-----------( 79       ( 11 Feb 2024 05:13 )             32.6     02-11    141  |  104  |  40<H>  ----------------------------<  110<H>  4.2   |  18  |  1.6<H>    Ca    8.6      11 Feb 2024 05:13  Mg     2.1     02-11    Lactate Trend  02-10 @ 17:40 Lactate:1.9   02-10 @ 09:40 Lactate:2.6     Urinalysis Basic - ( 11 Feb 2024 05:13 )    Color: x / Appearance: x / SG: x / pH: x  Gluc: 110 mg/dL / Ketone: x  / Bili: x / Urobili: x   Blood: x / Protein: x / Nitrite: x   Leuk Esterase: x / RBC: x / WBC x   Sq Epi: x / Non Sq Epi: x / Bacteria: x    Medications:  MEDICATIONS  (STANDING):  ampicillin/sulbactam  IVPB 3 Gram(s) IV Intermittent every 6 hours  ampicillin/sulbactam  IVPB      azithromycin  IVPB 500 milliGRAM(s) IV Intermittent every 24 hours  azithromycin  IVPB      pantoprazole  Injectable 40 milliGRAM(s) IV Push daily    MEDICATIONS  (PRN):    Allergies    No Known Allergies    Intolerances

## 2024-02-13 NOTE — HOSPICE CARE NOTE - CONVESATION DETAILS
Spoke at length with pt’s niece Karen, pt does not have Medicaid and niece states he has too many assets to apply for Medicaid, pt will need TARA for SNF placement with private pay, hospice to remain available when d/c plan in place.

## 2024-02-13 NOTE — SWALLOW BEDSIDE ASSESSMENT ADULT - COMMENTS
Chart reviewed, contents noted, pending family meeting, ?hospice.   Hospice care note: Possible plan for SNF placement with private pay, hospice to remain available when d/c plan in place. Pt's niece endorses she does not want life-prolonging interventions as this would extend patient's suffering. She was agreeable to patient having small sips for comfort. Pt. is DNR/DNI
Chart reviewed, contents noted, pending family meeting, ?hospice consult
CT Head w/o contrast (2/10/24)- There is focal gray-white differentiation loss in the right parietal   lobe, series 2 image 19, series 601 image 19, series 602 image 74 with   mild underlying hypoattenuation most likely reflecting age indeterminate   infarct possibly acute to subacute and new since 2019. Correlate with   clinical symptoms and consider MRI follow-up. No intracranial hemorrhage or mass effect.    CXR (2/10/24)- Right basilar opacity.

## 2024-02-13 NOTE — PROGRESS NOTE ADULT - SUBJECTIVE AND OBJECTIVE BOX
Chief complaint: Patient is a 89y old  Male who presents with a chief complaint of chest pain (10 Feb 2024 14:18)    Interval history: Patient was seen and evaluated at bedside, significantly more awake then yesterday. Patient oriented to self and place, not month/year. He is reporting dry mouth, as he is NPO. He denies CP, SOB.     Review of systems: A complete 10-point review of systems was obtained and is negative except as stated in the interval history.    Vitals:  ICU Vital Signs Last 24 Hrs  T(C): 36.6 (13 Feb 2024 08:32), Max: 36.6 (13 Feb 2024 08:32)  T(F): 97.8 (13 Feb 2024 08:32), Max: 97.8 (13 Feb 2024 08:32)  HR: 79 (13 Feb 2024 08:32) (76 - 82)  BP: 124/67 (13 Feb 2024 08:32) (110/66 - 124/67)  BP(mean): 90 (13 Feb 2024 08:32) (81 - 90)  RR: 18 (13 Feb 2024 08:32) (18 - 22)  SpO2: 96% (13 Feb 2024 04:18) (95% - 98%)    O2 Parameters below as of 13 Feb 2024 04:18  Patient On (Oxygen Delivery Method): nasal cannula  O2 Flow (L/min): 2    Physical exam:  General: Elderly male, on NC.   HEENT: Anicteric sclera. Moist mucous membranes.   Cardiac: Regular rate and rhythm. No murmurs, rubs, or gallops.   Vascular: Symmetric radial pulses. Dorsalis pedis pulses palpable.   Respiratory: diminished breath sounds b/l, no tachypnea/accessory muscle use.  Neurologic: lethargic, more oriented to self and place not time.   Abdomen: Soft, non tender. Audible bowel sounds.   Extremities: Warm with no edema. No cyanosis or clubbing.   Skin: Warm and dry. No rash.       Data reviewed:  - Telemetry: NSR 80 bpm  1 episode of VT on tele   - ECG  < from: 12 Lead ECG (02.09.24 @ 12:17)   Ventricular Rate 73 BPM    QRS Duration 126 ms    Q-T Interval 418 ms    QTC Calculation(Bazett) 460 ms    R Axis -87 degrees    T Axis 63 degrees    Diagnosis Line Sinus rhythm with first degree AV block and premature atrial complexes  Left axis deviation  Non-specific intra-ventricular conduction block  Minimal voltage criteria for LVH, may be normal variant ( [place] product )  Cannot rule out Septal infarct  Abnormal ECG  Confirmed by Rob Tillman ([id number]) on 2/9/2024 12:37:19 PM  - Chest x-ray < from: Xray Chest 1 View- PORTABLE-Urgent (Xray Chest 1 View- PORTABLE-Urgent .) (02.10.24 @ 06:30) >  Impression: Right basilar opacity.   CT Head No Cont (02.10.24 @ 10:06) >  IMPRESSION:    Motion degraded exam with streak artifacts. Thin hyperdensity along the   high right frontoparietal convexity could be artifact versus thin   subdural hemorrhage. Recommend short-term follow-up CT head. No large   acute territorial infarct.     Xray Pelvis AP only (02.09.24 @ 15:16) >  IMPRESSION:    Bones are osteopenic.    No definite evidence of acute displaced fracture or dislocation.    Lumbosacral spine, sacroiliac joint, pubic symphysis and bilateral hip   degenerative changes.    - Labs: -- labs discontinued after 2/11/24 as patient CMO                         10.9   9.72  )-----------( 79       ( 11 Feb 2024 05:13 )             32.6     02-11    141  |  104  |  40<H>  ----------------------------<  110<H>  4.2   |  18  |  1.6<H>    Ca    8.6      11 Feb 2024 05:13  Mg     2.1     02-11    Lactate Trend  02-10 @ 17:40 Lactate:1.9   02-10 @ 09:40 Lactate:2.6     Urinalysis Basic - ( 11 Feb 2024 05:13 )    Color: x / Appearance: x / SG: x / pH: x  Gluc: 110 mg/dL / Ketone: x  / Bili: x / Urobili: x   Blood: x / Protein: x / Nitrite: x   Leuk Esterase: x / RBC: x / WBC x   Sq Epi: x / Non Sq Epi: x / Bacteria: x    Medications:  MEDICATIONS  (STANDING):  ampicillin/sulbactam  IVPB 3 Gram(s) IV Intermittent every 6 hours  ampicillin/sulbactam  IVPB      azithromycin  IVPB 500 milliGRAM(s) IV Intermittent every 24 hours  azithromycin  IVPB      chlorhexidine 2% Cloths 1 Application(s) Topical daily  pantoprazole  Injectable 40 milliGRAM(s) IV Push daily    MEDICATIONS  (PRN):

## 2024-02-13 NOTE — PROGRESS NOTE ADULT - ASSESSMENT
89 year old male, poor historian, former smoker, with PMHx of CAD with RCA stents(years ago at [facility]), HTN, HLD, GERD, [place] who was BIBEMS for worsening non-radiating chest pain, s/p unwitnessed fall 2/9 AM. Pt was admitted to cardiac telemetry for NSTEMI. Pt was loaded with ASA/plavix, started on IV heparin drip, 2/10 AM pt noted to be more confused with dysarthria, L sided facial droop, L sided weakness UE>LE, +gurgling anterior chest /neck, sats 97% on 3 L NC. Patient not following commands, pointing towards R side ceiling. Neurology called and CThead obtained, showed subacute vs acute infarct, patient currently on comfort measures per hcp (karen cao).       Problems discussed and associated plan:  # Acute CVA  - Monitor on telemetry  - fall/aspiration/seizure precautions    - palliative care rec IV Dilaudid 0.2mg Q3h PRN for pain, dyspnea, tachypnea  - patient NPO given mental status; however, more alert this AM, consulted speech for new swallow eval per palliative rec  - family meeting w nash Jones->pt DNR/DNI , does not wish to have aggressive interventions for the pt at this time      #NSTEMI  - As per nash pt has been complaining chest pain for long time  - Trop 1400-->1200-->1500-->1280  - On Atorvastatin 40mg -->but on hold as pt not candidate for PO now   - ECHO 2/12/24 EF 25% Mod-Severe MR, AR   - Pt does not appear to be candidate for cardiac angiogram due to mental status. Will treat conservatively     #Pneumonia, possible aspiration PNA  - CXR Right basilar opacity  - On IV Unasyn and Azithromycin  - labs stopped as patient is comfort measures   - strict Aspiration precautions   - monitor respiratory status, currently sats 95% on 3 L NC  - consider nebs if needed   -patient is NPO given mental status     #HAYLEE  - Cr 1.8 on admission-->1.7 today  (baseline Cr 1.0-1.1 in Oct 2023)  - avoid nephrotoxins   - pt has warren now, monitor strict Is and Os     #Thrombocytopenia   - chronic   - plts Oct 2023 110-->current 79    #GERD  - on protonix 40 mg IV     #HLD  - Atorvastatin 40mg qhs on hold d/t NPO status    -  lipid profile LDL 50, Triglycerides 59    #DVT ppx/GI ppx  - IV protonix 40 mg daily   -comfort measures     #GOC  - Niece Karen Elver  at bedside, GOC discussion was done, family made aware of patient's poor prognosis. Karen reports  she is hcp, and wants DNR/DNI-->MOLST form signed   -SW on board, hospice consult pending    Please contact me with any questions or concerns at [phone].

## 2024-02-13 NOTE — PROGRESS NOTE ADULT - CONVERSATION DETAILS
Discussed with patient's niece/HCP, Karen. She states that patient would not enjoy his current quality of life. She does not want life-prolonging interventions as this would extend his suffering. She was agreeable to no labs, vitals, antibiotics. She was also agreeable to patient having small sips for comfort. DNR/DNI

## 2024-02-13 NOTE — PHARMACOTHERAPY INTERVENTION NOTE - COMMENTS
Recommended to order a Legionella urinary antigen since patient has been empirically started on azithromycin for the treatment of pneumonia.        Harika Melchor, PharmD   Clinical Pharmacy Specialist, Infectious Diseases  Tele-Antimicrobial Stewardship Program (Tele-ASP)  Tele-ASP Phone: [phone]

## 2024-02-13 NOTE — SWALLOW BEDSIDE ASSESSMENT ADULT - SLP PERTINENT HISTORY OF CURRENT PROBLEM
89 year old male, poor historian, former smoker, with PMHx of CAD with RCA stents(years ago at [facility]), HTN, HLD, GERD, [place] who was BIBEMS for worsening non-radiating chest pain, s/p unwitnessed fall 2/9 AM. Pt was admitted to cardiac telemetry for NSTEMI.
89 year old male, poor historian, former smoker, with PMHx of CAD with RCA stents(years ago at [facility]), HTN, HLD, GERD, [place] who was BIBEMS for worsening non-radiating chest pain, s/p unwitnessed fall 2/9 AM. Pt was admitted to cardiac telemetry for NSTEMI.
89 year old male, poor historian, former smoker, with PMHx of CAD with RCA stents(years ago at [facility]), HTN, HLD, GERD, [place] who was BIBEMS for worsening non-radiating chest pain, s/p unwitnessed fall 2/9 AM. Pt was admitted to cardiac telemetry for NSTEMI.

## 2024-02-13 NOTE — SWALLOW BEDSIDE ASSESSMENT ADULT - SLP GENERAL OBSERVATIONS
Pt received awake, open mouth posture, auditory respiratory rattle, +ronchus, min verbalizations, 2L NC.
Pt received in bed asleep, open mouth posture, auditory respiratory rattle
Pt with decreased alertness/responsive to maximal multimodal stim which RN reports is consistent with baseline this date. On 4L of o2 via NC.

## 2024-02-13 NOTE — SWALLOW BEDSIDE ASSESSMENT ADULT - NS SPL SWALLOW CLINIC TRIAL FT
No responsiveness/awareness to stim given ice chip to labial surface
no appropriate for po trials at this time
+overt s/s of aspiration, suspected absent swallow trigger upon palpation. Further PO trials discontinued 2/2 high aspiration risk.

## 2024-02-13 NOTE — SWALLOW BEDSIDE ASSESSMENT ADULT - ADDITIONAL RECOMMENDATIONS
Further ST f/u is not indicated at this time. Continue communication with hospice/palliative care team for CMO and/or hospice care.

## 2024-02-13 NOTE — SWALLOW BEDSIDE ASSESSMENT ADULT - SWALLOW EVAL: DIAGNOSIS
Pt not appropriate for po intake at this time, +wakes to stim, y/n responses, unable to maintain arousal, +auditory respiratory rattle
Suspected secretion management deficits + grossly overt s/s of aspiration with small amounts of mildly thick liquids, suspected absent swallow trigger upon palpation. Further PO trials discontinued 2/2 high aspiration risk.
Unable to functionally assess oropharyngeal swallow function given significant lethargy, decreased mentation

## 2024-02-13 NOTE — PROGRESS NOTE ADULT - SUBJECTIVE AND OBJECTIVE BOX
HPI:  89 year old male, poor historian, former smoker, with PMHx of CAD with RCA stents, HTN, HLD, GERD, [place] who was BIBEMS for worsening non-radiating chest pain that started yesterday. Pt describes pain as sharp, midsternal, lasting for a few minutes at a time and self-resolves. Pt reports having intermittent chest pain for approximately 1 month but the past 2 days it has worsened, rating 8/10. He follows Dr. Martinez at [facility]. Pt currently chest pain free. Denies SOB, fevers/chills, N/V, Denies recent travel and recent illness.     In the ED: VS 98.3F, HR 75, /61, O2 sat 95% on room air. 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 Labs were significant for cr of 1.8 (baseline cr 1.0-1.1 in 10/2023), troponin 1409. CXR neg for cardiopulmonary disease. Pt received  mg x1, Plavix 300 mg x1, and was started on Heparin drip.     Of note, attempted to call Karen mensah) multiple times for collateral information however, she did not respond.  (09 Feb 2024 18:46)    Interval history:  Patient more alert and communicative. Reports that he is thirsty but denies pain. Able to tell me that Karen is his niece. Did not answer orientation questions.       ITEMS NOT CHECKED ARE NOT PRESENT    SOCIAL HISTORY:   Significant other/partner[ ]  Children[ ]  Anabaptist/Spirituality:  Substance hx:  [ ]   Tobacco hx:  [ ]   Alcohol hx: [ ]   Living Situation: [ ]Home  [ ]Long term care  [ ]Rehab [ ]Other  Home Services: [ ] HHA [ ] Visting RN [ ] Hospice  Occupation:  Home Opioid hx:  [ ] Y [ ] N [ ] I-Stop Reference No:     ADVANCE DIRECTIVES:    [ ] Full Code [ ] DNR  MOLST  [ ]  Living Will  [ ]   DECISION MAKER(s):  [x ] Health Care Proxy(s)  [ ] Surrogate(s)  [ ] Guardian           Name(s):   Karen (niece)    BASELINE (I)ADL(s) (prior to admission):  [place]: [ ]Total  [ ] Moderate [ ]Dependent  Palliative Performance Status Version 2:         %    http://npcrc.org/files/news/palliative_performance_scale_ppsv2.pdf    Allergies    No Known Allergies    Intolerances    MEDICATIONS  (STANDING):  ampicillin/sulbactam  IVPB 3 Gram(s) IV Intermittent every 6 hours  ampicillin/sulbactam  IVPB      azithromycin  IVPB 500 milliGRAM(s) IV Intermittent every 24 hours  azithromycin  IVPB      chlorhexidine 2% Cloths 1 Application(s) Topical daily  pantoprazole  Injectable 40 milliGRAM(s) IV Push daily    MEDICATIONS  (PRN):        PRESENT SYMPTOMS: [x ]Unable to obtain due to poor mentation   Source if other than patient:  [ ]Family   [ ]Team     Pain: [ ]yes [ ]no  QOL impact -   Location -                    Aggravating factors -  Alleviating factors -   Quality -  Radiation -  Timing -   Severity (0-10 scale):  Minimal acceptable level (0-10 scale):     CPOT:    https://www.[facility].org/getattachment/duf94x81-3l0x-5q4l-8q8j-9100z2423z7f/Critical-Care-Pain-Observation-Tool-(CPOT)    PAIN AD Score: 0  http://geriatrictoolkit.[facility]/cog/painad.pdf     Dyspnea:                           [ ]None[ ]Mild [ ]Moderate [ ]Severe     Respiratory Distress Observation Scale (RDOS): 2  A score of 0 to 2 signifies little or no respiratory distress, 3 signifies mild distress, scores 4 to 6 indicate moderate distress, and scores greater than 7 signify severe distress  https://www.[facility].ca/sites/default/files/PDFS/420073-nvpfrsgzoqp-jllgrtgn-fcddmwbccoi-tphid.pdf    Anxiety:                             [ ]None[ ]Mild [ ]Moderate [ ]Severe   Fatigue:                             [ ]None[ ]Mild [ ]Moderate [ ]Severe   Nausea:                             [ ]None[ ]Mild [ ]Moderate [ ]Severe   Loss of appetite:              [ ]None[ ]Mild [ ]Moderate [ ]Severe   Constipation:                    [ ]None[ ]Mild [ ]Moderate [ ]Severe    Other Symptoms:  [ ]All other review of systems negative     Palliative Performance Status Version 2:         %    http://npcrc.org/files/news/palliative_performance_scale_ppsv2.pdf  PHYSICAL EXAM:  Vital Signs Last 24 Hrs  T(C): 36.5 (12 Feb 2024 07:26), Max: 37.4 (11 Feb 2024 15:35)  T(F): 97.7 (12 Feb 2024 07:26), Max: 99.3 (11 Feb 2024 15:35)  HR: 89 (12 Feb 2024 07:26) (76 - 89)  BP: 135/74 (12 Feb 2024 07:26) (124/66 - 138/81)  BP(mean): 99 (12 Feb 2024 07:26) (89 - 99)  RR: 21 (12 Feb 2024 07:26) (18 - 21)  SpO2: 97% (12 Feb 2024 07:26) (96% - 98%)    Parameters below as of 12 Feb 2024 04:00  Patient On (Oxygen Delivery Method): nasal cannula  O2 Flow (L/min): 2   I&O's Summary    11 Feb 2024 07:01  -  12 Feb 2024 07:00  --------------------------------------------------------  IN: 200 mL / OUT: 0 mL / NET: 200 mL        GENERAL:  NAD   PULMONARY:  Non labored breathing  NEUROLOGIC: Alert, speaks in short phrases  BEHAVIORAL/PSYCH:  Calm.     CRITICAL CARE:  [ ] Shock Present  [ ]Septic [ ]Cardiogenic [ ]Neurologic [ ]Hypovolemic  [ ]  Vasopressors [ ]  Inotropes   [ ]Respiratory failure present [ ]Mechanical ventilation [ ]Non-invasive ventilatory support [ ]High flow  [ ]Acute  [ ]Chronic [ ]Hypoxic  [ ]Hypercarbic [ ]Other  [ ]Other organ failure     LABS: I have reviewed daily labs                 RADIOLOGY & ADDITIONAL STUDIES: I have reviewed new imaging    PROTEIN CALORIE MALNUTRITION PRESENT: [ ]mild [ ]moderate [ ]severe [ ]underweight [ ]morbid obesity  https://www.andeal.org/vault/8310/web/files/ONC/Table_Clinical%20Characteristics%20to%20Document%20Malnutrition-White%20JV%20et%20al%202012.pdf    Height (cm): 167.6 (02-09-24 @ 20:49), 165.1 (11-10-23 @ 05:41), 165.1 (10-26-23 @ 20:34)  Weight (kg): 77.1 (02-09-24 @ 12:12), 59 (11-10-23 @ 05:41), 61.2 (10-26-23 @ 20:34)  BMI (kg/m2): 27.4 (02-09-24 @ 20:49), 28.3 (02-09-24 @ 12:12), 21.6 (11-10-23 @ 05:41)    [ ]PPSV2 < or = to 30% [ ]significant weight loss  [ ]poor nutritional intake  [ ]anasarca      [ ]Artificial Nutrition      Palliative Care Spiritual/Emotional Screening Tool Question  Severity (0-4):                    OR                    [ x] Unable to determine. Will assess at later time if appropriate.  Score of 2 or greater indicates recommendation of Chaplaincy and/or SW referral  Chaplaincy Referral: [ ] Yes [ ] Refused [ ] Following     Caregiver Cable:  [ ] Yes [ ] No    OR    [x ] Unable to determine. Will assess at later time if appropriate.  Social Work Referral [ ]  Patient and Family Centered Care Referral [ ]    Anticipatory Grief Present: [ ] Yes [ ] No    OR     [ x] Unable to determine. Will assess at later time if appropriate.  Social Work Referral [ ]  Patient and Family Centered Care Referral [ ]    REFERRALS:   [ ]Chaplaincy  [ ]Hospice  [ ]Child Life  [ ]Social Work  [ ]Case management [ ]Holistic Therapy     Palliative care education provided to patient and/or family

## 2024-02-13 NOTE — SWALLOW BEDSIDE ASSESSMENT ADULT - SWALLOW EVAL: RECOMMENDED DIET
NPO, non-oral means of nutrition and hydration if this is consistent w/ pt and family wishes
NPO; suggest short term alternative means nutrition/hydration/meds
Unable to provide recommendations for oral diet. Consider CMO if within GOC and pt and family wishes

## 2024-02-13 NOTE — PROGRESS NOTE ADULT - ASSESSMENT
89 year old male, poor historian, former smoker, with PMHx of CAD with RCA stents, HTN, HLD, GERD, [place] who was BIBEMS for worsening non-radiating chest pain. Patient found to have new stroke. Per chart, patient's family has decided on no invasive interventions and would like to focus on comfort.     2/13: Patient more alert and communicative. Reports that he is thirsty but denies pain. Able to tell me that Karen is his niece. Did not answer orientation questions.     Plan:  - can consider IV dilaudid 0.2mg q3h PRN for pain/dyspnea/tachypnea  - I have called patient's HCP, Karen, several times with no response; left VM for callback to discuss plan of care    - need to discuss comfort feeds with HCP, in the meantime, recommend swallow eval  - DNR/DNI    Palliative care will continue to follow.   Please call [phone] with questions or concerns 24/7.   _____________  He Checo Jones MD  Palliative Medicine  [facility]   of Geriatric and Palliative Medicine  [phone]   89 year old male, poor historian, former smoker, with PMHx of CAD with RCA stents, HTN, HLD, GERD, [place] who was BIBEMS for worsening non-radiating chest pain. Patient found to have new stroke. Per chart, patient's family has decided on no invasive interventions and would like to focus on comfort.     2/13: Patient more alert and communicative. Reports that he is thirsty but denies pain. Able to tell me that Karen is his niece. Did not answer orientation questions.   Discussed with patient's niece/HCP, Karen. She states that patient would not enjoy his current quality of life. She does not want life-prolonging interventions as this would extend his suffering. She was agreeable to no labs, vitals, antibiotics. She was also agreeable to patient having small sips for comfort. DNR/DNI    Plan:  - start IV dilaudid 0.2mg q3h PRN for pain/dyspnea/tachypnea  - CMO: no labs, vitals, antibiotics; allow for comfort feeds  - DNR/DNI  - pending dispo    Discussed with primary team.     Palliative care will continue to follow.   Please call [phone] with questions or concerns 24/7.   _____________  Anil Jones MD  Palliative Medicine  [facility]   of Geriatric and Palliative Medicine  [phone]

## 2024-02-14 NOTE — CHART NOTE - NSCHARTNOTEFT_GEN_A_CORE
Repeat Head CT reviewed. < from: CT Head No Cont (02.10.24 @ 20:57) >    There is focal gray-white differentiation loss in the right parietal   lobe, series 2 image 19, series 601 image 19, [id number] image 74 with   mild underlying hypoattenuation most likely reflecting age indeterminate   infarct possibly acute to subacute and new since 2019. Correlate with   clinical symptoms and consider MRI follow-up.    No intracranial hemorrhage or mass effect.    < end of copied text >    No further Neurosurgical intervention. Reconsult PRN
Called to evaluate pt wth chest pain and elevated troponin.     89 yo M w CAD s/p RCA stents presents with intermittent substernal chest pain, apparently for a month , but started since this AM. Currently pain free. EKG NSR with 1st degree AVB, IVCD, q wave in V2.   HST 1402     Recs-   -aspirin 325 mg, plavix 300 mg, UFH for ACS protocol  -TTE  -SL nitro PRN   -serial EKGs and HST   -telemonitoring
Had long GOC discussion with family, including Mr Quarles's nejessica (POA). I explained that he has had an acute CVA and based on neurology and NSGY assessment, he has a poor prognosis. She reiterated that she wishes for him to remain DNR/DNI (ordered 2/10/24) and that she does not want invasive measures performed. I asked if she wishes for us to continue with CVA work up and management (ie management that may prolong his life). She said she prefers to focus on comfort measures- she "just wants him to be comfortable". Will discuss with team- no labs or additional imaging tests.     Will consult palliative care to assist with comfort measures. Consult CM for possible hospice evaluation.
89 year old male, poor historian, former smoker, with PMHx of CAD with RCA stents(years ago at [facility]), HTN, HLD, GERD, [place] who was BIBEMS for worsening non-radiating chest pain, s/p unwitnessed fall 2/9 AM. Pt was admitted to cardiac telemetry for NSTEMI. Pt was loaded with ASA/plavix, started on IV heparin drip, 2/10 AM pt noted to be more confused with dysarthria, L sided facial droop, L sided weakness UE>LE, +gurgling anterior chest /neck, sats 97% on 3 L NC. Patient not following commands, pointing towards R side ceiling. Neurology called and CThead obtained, showed subacute vs acute infarct, patient currently on comfort measures per hcp (karen cao).       Problems discussed and associated plan:  # Acute CVA  - Monitor on telemetry  - fall/aspiration/seizure precautions    - palliative care rec IV Dilaudid 0.2mg Q3h PRN for pain, dyspnea, tachypnea  - patient NPO given mental status; however, more alert this AM, consulted speech for new swallow eval per palliative rec  - family meeting w nash Jones->pt DNR/DNI , does not wish to have aggressive interventions for the pt at this time      #NSTEMI  - As per nash pt has been complaining chest pain for long time  - Trop 1400-->1200-->1500-->1280  - On Atorvastatin 40mg -->but on hold as pt not candidate for PO now   - ECHO 2/12/24 EF 25% Mod-Severe MR, AR   - Pt does not appear to be candidate for cardiac angiogram due to mental status. Will treat conservatively     #Pneumonia, possible aspiration PNA  - CXR Right basilar opacity  - On IV Unasyn and Azithromycin  - labs stopped as patient is comfort measures   - strict Aspiration precautions   - monitor respiratory status, currently sats 95% on 3 L NC  - consider nebs if needed   -patient is NPO given mental status     #HAYLEE  - Cr 1.8 on admission-->1.7 today  (baseline Cr 1.0-1.1 in Oct 2023)  - avoid nephrotoxins   - pt has warren now, monitor strict Is and Os     #Thrombocytopenia   - chronic   - plts Oct 2023 110-->current 79    #GERD  - on protonix 40 mg IV     #HLD  - Atorvastatin 40mg qhs on hold d/t NPO status    -  lipid profile LDL 50, Triglycerides 59    #DVT ppx/GI ppx  - IV protonix 40 mg daily   -comfort measures     #GOC  - Niece Karen Billdavid  at bedside, GOC discussion was done, family made aware of patient's poor prognosis. Karen reports  she is hcp, and wants DNR/DNI-->MOLST form signed     -SW on board. Plan for long term nursing home with hospice (will need a private room at the NH).    Please contact me with any questions or concerns at [phone].

## 2024-02-14 NOTE — PROGRESS NOTE ADULT - ASSESSMENT
89 year old male, poor historian, former smoker, with PMHx of CAD with RCA stents(years ago at [facility]), HTN, HLD, GERD, [place] who was BIBEMS for worsening non-radiating chest pain, s/p unwitnessed fall 2/9 AM. Pt was admitted to cardiac telemetry for NSTEMI. Pt was loaded with ASA/plavix, started on IV heparin drip, 2/10 AM pt noted to be more confused with dysarthria, L sided facial droop, L sided weakness UE>LE, +gurgling anterior chest /neck, sats 97% on 3 L NC. Patient not following commands, pointing towards R side ceiling. Neurology called and CThead obtained, showed subacute vs acute infarct, patient currently on comfort measures per hcp (karen cao).       Problems discussed and associated plan:  # Acute CVA  - Monitor on telemetry  - fall/aspiration/seizure precautions    - palliative care rec IV Dilaudid 0.2mg Q3h PRN for pain, dyspnea, tachypnea  - patient NPO given mental status; however, more alert this AM, consulted speech for new swallow eval per palliative rec  - family meeting w nash Jones->pt DNR/DNI , does not wish to have aggressive interventions for the pt at this time      #NSTEMI  - As per nash pt has been complaining chest pain for long time  - Trop 1400-->1200-->1500-->1280  - On Atorvastatin 40mg -->but on hold as pt not candidate for PO now   - ECHO 2/12/24 EF 25% Mod-Severe MR, AR   - Pt does not appear to be candidate for cardiac angiogram due to mental status. Will treat conservatively     #Pneumonia, possible aspiration PNA  - CXR Right basilar opacity  - On IV Unasyn and Azithromycin  - labs stopped as patient is comfort measures   - strict Aspiration precautions   - monitor respiratory status, currently sats 95% on 3 L NC  - consider nebs if needed   -patient is NPO given mental status     #HAYLEE  - Cr 1.8 on admission-->1.7 today  (baseline Cr 1.0-1.1 in Oct 2023)  - avoid nephrotoxins   - pt has warren now, monitor strict Is and Os     #Thrombocytopenia   - chronic   - plts Oct 2023 110-->current 79    #GERD  - on Protonix 40 mg IV     #HLD  - Atorvastatin 40mg qhs on hold d/t NPO status    -  lipid profile LDL 50, Triglycerides 59    #DVT ppx/GI ppx  - IV protonix 40 mg daily   -comfort measures     #GOC  - Niece Karen Billdavid  at bedside, GOC discussion was done, family made aware of patient's poor prognosis. Karen reports  she is hcp, and wants DNR/DNI-->MOLST form signed   -SW on board. Plan for long term nursing home with hospice (will need a private room at the NH).    Please contact me with any questions or concerns at [phone].

## 2024-02-14 NOTE — PROGRESS NOTE ADULT - NS ATTEND AMEND GEN_ALL_CORE FT
Patient seen and examined. Pertinent labs, imaging and telemetry reviewed. I agree with the above:     Patient complaining of phlegm. Patient more alert today. He is thirsty.   -After discussion with 4T attending over weekend, pt is DNR/DNI with comfort measures.   -I discussed with HCP again and she doesn't want aggressive measures with work up or treatment.   -She wants her uncle to be "comfortable".   -Palliative care recs appreciated.   -Hospice initiated with CM.     89yoM with PMhx of CAD s/p RCA PCI, HTN, HLD who is admitted for acute CVA. He was initially admitted to cardiac tele due to elevated troponin. Suspect that troponin is demand ischemia related to CVA vs delirium. Low suspicion for ACS at this time.   -Had consulted neuro and NSGY given acute CVA. No need for further work up given CMO.   -Continue statin and ASA  -Have initiated GOC discussion with family. Patient is now DNR/DNI.  -Palliative care consult .     Discussed with patient and ACP.
Patient seen and examined. Pertinent labs, imaging and telemetry reviewed. I agree with the above:     Patient not as interactive today. No acute distress.    -After discussion with 4T attending over weekend, pt is DNR/DNI with comfort measures.   -I discussed with HCP again and she doesn't want aggressive measures with work up or treatment.   -She wants her uncle to be "comfortable".   -Palliative care recs appreciated.   -Hospice initiated with CM.     89yoM with PMhx of CAD s/p RCA PCI, HTN, HLD who is admitted for acute CVA. He was initially admitted to cardiac tele due to elevated troponin. Suspect that troponin is demand ischemia related to CVA vs delirium. Low suspicion for ACS at this time.   -Had consulted neuro and NSGY given acute CVA. No need for further work up given CMO.   -Continue statin and ASA  -Patient is now DNR/DNI and CMO. Tele removed  -Palliative care recs appreciated.   -Transfer to med/surg floor.     Discussed with patient and ACP.
Agree with assessment and plan above, unless otherwise documented in my attestation.    See H&P attestation. Patient has developed acute encephalopathy, likely due to acute CVA.
Patient seen and examined. Pertinent labs, imaging and telemetry reviewed. I agree with the above:     Patient complaining of phlegm. Niece/HCP, Karen at bedside.  -After discussion with 4T attending over weekend, pt is DNR/DNI with comfort measures.   -I discussed with HCP again and she doesn't want aggressive measures with work up or treatment.   -She wants her uncle to be "comfortable".   -Palliative care pending discussion with HCP.   -Hospice initiated with CM.     89yoM with PMhx of CAD s/p RCA PCI, HTN, HLD who is admitted for acute CVA. He was initially admitted to cardiac tele due to elevated troponin. Suspect that troponin is demand ischemia related to CVA vs delirium. Low suspicion for ACS at this time.   -Had consulted neuro and NSGY given acute CVA. No need for further work up given possible CMO.   -Continue statin and ASA  -Have initiated GOC discussion with family. Patient is now DNR/DNI.  -Palliative care consult .     Discussed with patient, HCP and ACP.

## 2024-02-14 NOTE — PROGRESS NOTE ADULT - ASSESSMENT
89 year old male, poor historian, former smoker, with PMHx of CAD with RCA stents, HTN, HLD, GERD, [place] who was BIBEMS for worsening non-radiating chest pain. Patient found to have new stroke. Per chart, patient's family has decided on no invasive interventions and would like to focus on comfort.     2/13: Patient more alert and communicative. Reports that he is thirsty but denies pain. Able to tell me that Karen is his niece. Did not answer orientation questions.   Discussed with patient's niece/HCP, Karen. She states that patient would not enjoy his current quality of life. She does not want life-prolonging interventions as this would extend his suffering. She was agreeable to no labs, vitals, antibiotics. She was also agreeable to patient having small sips for comfort. DNR/DNI    2/14: Discussed with primary team and RN to allow and assist with comfort feeds.     Plan:  - start IV dilaudid 0.2mg q3h PRN for pain/dyspnea/tachypnea    - if patient requires opioids on discharge, can start Roxanol solution 5mg q4h PRN for pain/dyspnea  - CMO: no labs, vitals, antibiotics; allow for comfort feeds  - DNR/DNI  - pending dispo    Discussed with primary team.     Palliative care will continue to follow.   Please call [phone] with questions or concerns 24/7.   _____________  Anil Jones MD  Palliative Medicine  [facility] Pall[facility]  [phone]

## 2024-02-14 NOTE — PROGRESS NOTE ADULT - SUBJECTIVE AND OBJECTIVE BOX
Chief complaint: Patient is a 89y old  Male who presents with a chief complaint of chest pain (10 Feb 2024 14:18)    Interval history: Patient was seen and evaluated at bedside, significantly more awake. Patient oriented to self and place, not month/year. He denies CP, SOB.     Review of systems: A complete 10-point review of systems was obtained and is negative except as stated in the interval history.    Vitals:  ICU Vital Signs Last 24 Hrs  T(C): 36.6 (13 Feb 2024 08:32), Max: 36.6 (13 Feb 2024 08:32)  T(F): 97.8 (13 Feb 2024 08:32), Max: 97.8 (13 Feb 2024 08:32)  HR: 79 (13 Feb 2024 08:32) (76 - 82)  BP: 124/67 (13 Feb 2024 08:32) (110/66 - 124/67)  BP(mean): 90 (13 Feb 2024 08:32) (81 - 90)  RR: 18 (13 Feb 2024 08:32) (18 - 22)  SpO2: 96% (13 Feb 2024 04:18) (95% - 98%)    O2 Parameters below as of 13 Feb 2024 04:18  Patient On (Oxygen Delivery Method): nasal cannula  O2 Flow (L/min): 2    Physical exam:  General: Elderly male, on NC.   HEENT: Anicteric sclera. Moist mucous membranes.   Cardiac: Regular rate and rhythm. No murmurs, rubs, or gallops.   Vascular: Symmetric radial pulses. Dorsalis pedis pulses palpable.   Respiratory: diminished breath sounds b/l, no tachypnea/accessory muscle use.  Neurologic: lethargic, more oriented to self and place not time.   Abdomen: Soft, non tender. Audible bowel sounds.   Extremities: Warm with no edema. No cyanosis or clubbing.   Skin: Warm and dry. No rash.       Data reviewed:  - Telemetry: NSR 80 bpm   - ECG  < from: 12 Lead ECG (02.09.24 @ 12:17)   Ventricular Rate 73 BPM    QRS Duration 126 ms    Q-T Interval 418 ms    QTC Calculation(Bazett) 460 ms    R Axis -87 degrees    T Axis 63 degrees    Diagnosis Line Sinus rhythm with first degree AV block and premature atrial complexes  Left axis deviation  Non-specific intra-ventricular conduction block  Minimal voltage criteria for LVH, may be normal variant ( Praveen product )  Cannot rule out Septal infarct  Abnormal ECG  Confirmed by Rob Tillman ([id number]) on 2/9/2024 12:37:19 PM  - Chest x-ray < from: Xray Chest 1 View- PORTABLE-Urgent (Xray Chest 1 View- PORTABLE-Urgent .) (02.10.24 @ 06:30) >  Impression: Right basilar opacity.   CT Head No Cont (02.10.24 @ 10:06) >  IMPRESSION:    Motion degraded exam with streak artifacts. Thin hyperdensity along the   high right frontoparietal convexity could be artifact versus thin   subdural hemorrhage. Recommend short-term follow-up CT head. No large   acute territorial infarct.     Xray Pelvis AP only (02.09.24 @ 15:16) >  IMPRESSION:    Bones are osteopenic.    No definite evidence of acute displaced fracture or dislocation.    Lumbosacral spine, sacroiliac joint, pubic symphysis and bilateral hip   degenerative changes.    - Labs: -- labs discontinued after 2/11/24 as patient CMO                         10.9   9.72  )-----------( 79       ( 11 Feb 2024 05:13 )             32.6     02-11    141  |  104  |  40<H>  ----------------------------<  110<H>  4.2   |  18  |  1.6<H>    Ca    8.6      11 Feb 2024 05:13  Mg     2.1     02-11    Lactate Trend  02-10 @ 17:40 Lactate:1.9   02-10 @ 09:40 Lactate:2.6     Urinalysis Basic - ( 11 Feb 2024 05:13 )    Color: x / Appearance: x / SG: x / pH: x  Gluc: 110 mg/dL / Ketone: x  / Bili: x / Urobili: x   Blood: x / Protein: x / Nitrite: x   Leuk Esterase: x / RBC: x / WBC x   Sq Epi: x / Non Sq Epi: x / Bacteria: x    Medications:  MEDICATIONS  (STANDING):  ampicillin/sulbactam  IVPB 3 Gram(s) IV Intermittent every 6 hours  ampicillin/sulbactam  IVPB      azithromycin  IVPB 500 milliGRAM(s) IV Intermittent every 24 hours  azithromycin  IVPB      chlorhexidine 2% Cloths 1 Application(s) Topical daily  pantoprazole  Injectable 40 milliGRAM(s) IV Push daily    MEDICATIONS  (PRN):

## 2024-02-14 NOTE — PROGRESS NOTE ADULT - SUBJECTIVE AND OBJECTIVE BOX
HPI:  89 year old male, poor historian, former smoker, with PMHx of CAD with RCA stents, HTN, HLD, GERD, [place] who was BIBEMS for worsening non-radiating chest pain that started yesterday. Pt describes pain as sharp, midsternal, lasting for a few minutes at a time and self-resolves. Pt reports having intermittent chest pain for approximately 1 month but the past 2 days it has worsened, rating 8/10. He follows Dr. Martinez at [facility]. Pt currently chest pain free. Denies SOB, fevers/chills, N/V, Denies recent travel and recent illness.     In the ED: VS 98.3F, HR 75, /61, O2 sat 95% on room air. 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 Labs were significant for cr of 1.8 (baseline cr 1.0-1.1 in 10/2023), troponin 1409. CXR neg for cardiopulmonary disease. Pt received  mg x1, Plavix 300 mg x1, and was started on Heparin drip.     Of note, attempted to call Karen (nejessica) multiple times for collateral information however, she did not respond.  (09 Feb 2024 18:46)    Interval history:  Patient less coherent than yesterday, but appears to deny pain and endorse thirst.       ITEMS NOT CHECKED ARE NOT PRESENT    SOCIAL HISTORY:   Significant other/partner[ ]  Children[ ]  Tenriism/Spirituality:  Substance hx:  [ ]   Tobacco hx:  [ ]   Alcohol hx: [ ]   Living Situation: [ ]Home  [ ]Long term care  [ ]Rehab [ ]Other  Home Services: [ ] HHA [ ] Ros RN [ ] Hospice  Occupation:  Home Opioid hx:  [ ] Y [ ] N [ ] I-Stop Reference No:     ADVANCE DIRECTIVES:    [ ] Full Code [ ] DNR  MOLST  [ ]  Living Will  [ ]   DECISION MAKER(s):  [x ] Health Care Proxy(s)  [ ] Surrogate(s)  [ ] Guardian           Name(s):   Karen (niece)    BASELINE (I)ADL(s) (prior to admission):  [place]: [ ]Total  [ ] Moderate [ ]Dependent  Palliative Performance Status Version 2:         %    http://npcrc.org/files/news/palliative_performance_scale_ppsv2.pdf    Allergies    No Known Allergies    Intolerances    MEDICATIONS  (STANDING):  chlorhexidine 2% Cloths 1 Application(s) Topical daily  pantoprazole  Injectable 40 milliGRAM(s) IV Push daily    MEDICATIONS  (PRN):  HYDROmorphone  Injectable 0.2 milliGRAM(s) IV Push every 3 hours PRN Moderate Pain (4 - 6)          PRESENT SYMPTOMS: [x ]Unable to obtain due to poor mentation   Source if other than patient:  [ ]Family   [ ]Team     Pain: [ ]yes [ ]no  QOL impact -   Location -                    Aggravating factors -  Alleviating factors -   Quality -  Radiation -  Timing -   Severity (0-10 scale):  Minimal acceptable level (0-10 scale):     CPOT:    https://www.[facility].org/getattachment/[email or web address]/Critical-Care-Pain-Observation-Tool-(CPOT)    PAIN AD Score: 0  http://geriatrictoolkit.[facility]/cog/painad.pdf     Dyspnea:                           [ ]None[ ]Mild [ ]Moderate [ ]Severe     Respiratory Distress Observation Scale (RDOS): 2  A score of 0 to 2 signifies little or no respiratory distress, 3 signifies mild distress, scores 4 to 6 indicate moderate distress, and scores greater than 7 signify severe distress  https://www.[facility].ca/sites/default/files/PDFS/970521-ixckkgqqear-levpoxvu-yrxhbfostks-ngbmj.pdf    Anxiety:                             [ ]None[ ]Mild [ ]Moderate [ ]Severe   Fatigue:                             [ ]None[ ]Mild [ ]Moderate [ ]Severe   Nausea:                             [ ]None[ ]Mild [ ]Moderate [ ]Severe   Loss of appetite:              [ ]None[ ]Mild [ ]Moderate [ ]Severe   Constipation:                    [ ]None[ ]Mild [ ]Moderate [ ]Severe    Other Symptoms:  [ ]All other review of systems negative     Palliative Performance Status Version 2:         %    http://npcrc.org/files/news/palliative_performance_scale_ppsv2.pdf  PHYSICAL EXAM:      GENERAL:  NAD   PULMONARY:  Non labored breathing  NEUROLOGIC: Alert, speaks in short phrases  BEHAVIORAL/PSYCH:  Calm.     CRITICAL CARE:  [ ] Shock Present  [ ]Septic [ ]Cardiogenic [ ]Neurologic [ ]Hypovolemic  [ ]  Vasopressors [ ]  Inotropes   [ ]Respiratory failure present [ ]Mechanical ventilation [ ]Non-invasive ventilatory support [ ]High flow  [ ]Acute  [ ]Chronic [ ]Hypoxic  [ ]Hypercarbic [ ]Other  [ ]Other organ failure     LABS: I have reviewed daily labs                 RADIOLOGY & ADDITIONAL STUDIES: I have reviewed new imaging    PROTEIN CALORIE MALNUTRITION PRESENT: [ ]mild [ ]moderate [ ]severe [ ]underweight [ ]morbid obesity  https://www.andeal.org/vault/2440/web/files/ONC/Table_Clinical%20Characteristics%20to%20Document%20Malnutrition-White%20JV%20et%20al%202012.pdf    Height (cm): 167.6 (02-09-24 @ 20:49), 165.1 (11-10-23 @ 05:41), 165.1 (10-26-23 @ 20:34)  Weight (kg): 77.1 (02-09-24 @ 12:12), 59 (11-10-23 @ 05:41), 61.2 (10-26-23 @ 20:34)  BMI (kg/m2): 27.4 (02-09-24 @ 20:49), 28.3 (02-09-24 @ 12:12), 21.6 (11-10-23 @ 05:41)    [ ]PPSV2 < or = to 30% [ ]significant weight loss  [ ]poor nutritional intake  [ ]anasarca      [ ]Artificial Nutrition      Palliative Care Spiritual/Emotional Screening Tool Question  Severity (0-4):                    OR                    [ x] Unable to determine. Will assess at later time if appropriate.  Score of 2 or greater indicates recommendation of Chaplaincy and/or SW referral  Chaplaincy Referral: [ ] Yes [ ] Refused [ ] Following     Caregiver [place]:  [ ] Yes [ ] No    OR    [x ] Unable to determine. Will assess at later time if appropriate.  Social Work Referral [ ]  Patient and Family Centered Care Referral [ ]    Anticipatory Grief Present: [ ] Yes [ ] No    OR     [ x] Unable to determine. Will assess at later time if appropriate.  Social Work Referral [ ]  Patient and Family Centered Care Referral [ ]    REFERRALS:   [ ]Chaplaincy  [ ]Hospice  [ ]Child Life  [ ]Social Work  [ ]Case management [ ]Holistic Therapy     Palliative care education provided to patient and/or family

## 2024-02-15 NOTE — PROGRESS NOTE ADULT - SUBJECTIVE AND OBJECTIVE BOX
HPI:  89 year old male, poor historian, former smoker, with PMHx of CAD with RCA stents, HTN, HLD, GERD, [place] who was BIBEMS for worsening non-radiating chest pain that started yesterday. Pt describes pain as sharp, midsternal, lasting for a few minutes at a time and self-resolves. Pt reports having intermittent chest pain for approximately 1 month but the past 2 days it has worsened, rating 8/10. He follows Dr. Martinez at [facility]. Pt currently chest pain free. Denies SOB, fevers/chills, N/V, Denies recent travel and recent illness.     In the ED: VS 98.3F, HR 75, /61, O2 sat 95% on room air. 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 Labs were significant for cr of 1.8 (baseline cr 1.0-1.1 in 10/2023), troponin 1409. CXR neg for cardiopulmonary disease. Pt received  mg x1, Plavix 300 mg x1, and was started on Heparin drip.     Of note, attempted to call Karen rodrick) multiple times for collateral information however, she did not respond.  (09 Feb 2024 18:46)    Interval history:  Patient progressively less coherent over the past 2 days. He was speaking rather intelligibly 2 days ago and I can no longer make sense of what he is trying to say.       ITEMS NOT CHECKED ARE NOT PRESENT    SOCIAL HISTORY:   Significant other/partner[ ]  Children[ ]  Mosque/Spirituality:  Substance hx:  [ ]   Tobacco hx:  [ ]   Alcohol hx: [ ]   Living Situation: [ ]Home  [ ]Long term care  [ ]Rehab [ ]Other  Home Services: [ ] HHA [ ] Visting RN [ ] Hospice  Occupation:  Home Opioid hx:  [ ] Y [ ] N [ ] I-Stop Reference No:     ADVANCE DIRECTIVES:    [ ] Full Code [ ] DNR  MOLST  [ ]  Living Will  [ ]   DECISION MAKER(s):  [x ] Health Care Proxy(s)  [ ] Surrogate(s)  [ ] Guardian           Name(s):   Karen (niece)    BASELINE (I)ADL(s) (prior to admission):  [place]: [ ]Total  [ ] Moderate [ ]Dependent  Palliative Performance Status Version 2:         %    http://npcrc.org/files/news/palliative_performance_scale_ppsv2.pdf    Allergies    No Known Allergies    Intolerances    MEDICATIONS  (STANDING):  chlorhexidine 2% Cloths 1 Application(s) Topical daily  pantoprazole  Injectable 40 milliGRAM(s) IV Push daily    MEDICATIONS  (PRN):  HYDROmorphone  Injectable 0.2 milliGRAM(s) IV Push every 3 hours PRN Moderate Pain (4 - 6)  LORazepam   Injectable 2 milliGRAM(s) IV Push every 4 hours PRN Agitation      PRESENT SYMPTOMS: [x ]Unable to obtain due to poor mentation   Source if other than patient:  [ ]Family   [ ]Team     Pain: [ ]yes [ ]no  QOL impact -   Location -                    Aggravating factors -  Alleviating factors -   Quality -  Radiation -  Timing -   Severity (0-10 scale):  Minimal acceptable level (0-10 scale):     CPOT:    https://www.[facility].org/getattachment/[email or web address]/Critical-Care-Pain-Observation-Tool-(CPOT)    PAIN AD Score: 3  http://geriatrictoolkit.[facility]/cog/painad.pdf     Dyspnea:                           [ ]None[ ]Mild [ ]Moderate [ ]Severe     Respiratory Distress Observation Scale (RDOS): 2  A score of 0 to 2 signifies little or no respiratory distress, 3 signifies mild distress, scores 4 to 6 indicate moderate distress, and scores greater than 7 signify severe distress  https://www.[facility].ca/sites/default/files/PDFS/026427-punjhuzbqpg-zzkcckvn-zyfowemswsc-bqxpu.pdf    Anxiety:                             [ ]None[ ]Mild [ ]Moderate [ ]Severe   Fatigue:                             [ ]None[ ]Mild [ ]Moderate [ ]Severe   Nausea:                             [ ]None[ ]Mild [ ]Moderate [ ]Severe   Loss of appetite:              [ ]None[ ]Mild [ ]Moderate [ ]Severe   Constipation:                    [ ]None[ ]Mild [ ]Moderate [ ]Severe    Other Symptoms:  [ ]All other review of systems negative     Palliative Performance Status Version 2:         %    http://npcrc.org/files/news/palliative_performance_scale_ppsv2.pdf  PHYSICAL EXAM:      GENERAL:  NAD   PULMONARY:  Non labored breathing  NEUROLOGIC: Speech progressively less coherent.   BEHAVIORAL/PSYCH:  Calm.     CRITICAL CARE:  [ ] Shock Present  [ ]Septic [ ]Cardiogenic [ ]Neurologic [ ]Hypovolemic  [ ]  Vasopressors [ ]  Inotropes   [ ]Respiratory failure present [ ]Mechanical ventilation [ ]Non-invasive ventilatory support [ ]High flow  [ ]Acute  [ ]Chronic [ ]Hypoxic  [ ]Hypercarbic [ ]Other  [ ]Other organ failure     LABS: I have reviewed daily labs                 RADIOLOGY & ADDITIONAL STUDIES: I have reviewed new imaging    PROTEIN CALORIE MALNUTRITION PRESENT: [ ]mild [ ]moderate [ ]severe [ ]underweight [ ]morbid obesity  https://www.andeal.org/vault/2440/web/files/ONC/Table_Clinical%20Characteristics%20to%20Document%20Malnutrition-White%20JV%20et%20al%571513.pdf    Height (cm): 167.6 (02-09-24 @ 20:49), 165.1 (11-10-23 @ 05:41), 165.1 (10-26-23 @ 20:34)  Weight (kg): 77.1 (02-09-24 @ 12:12), 59 (11-10-23 @ 05:41), 61.2 (10-26-23 @ 20:34)  BMI (kg/m2): 27.4 (02-09-24 @ 20:49), 28.3 (02-09-24 @ 12:12), 21.6 (11-10-23 @ 05:41)    [ ]PPSV2 < or = to 30% [ ]significant weight loss  [ ]poor nutritional intake  [ ]anasarca      [ ]Artificial Nutrition      Palliative Care Spiritual/Emotional Screening Tool Question  Severity (0-4):                    OR                    [ x] Unable to determine. Will assess at later time if appropriate.  Score of 2 or greater indicates recommendation of Chaplaincy and/or SW referral  Chaplaincy Referral: [ ] Yes [ ] Refused [ ] Following     Caregiver [place]:  [ ] Yes [ ] No    OR    [x ] Unable to determine. Will assess at later time if appropriate.  Social Work Referral [ ]  Patient and Family Centered Care Referral [ ]    Anticipatory Grief Present: [ ] Yes [ ] No    OR     [ x] Unable to determine. Will assess at later time if appropriate.  Social Work Referral [ ]  Patient and Family Centered Care Referral [ ]    REFERRALS:   [ ]Chaplaincy  [ ]Hospice  [ ]Child Life  [ ]Social Work  [ ]Case management [ ]Holistic Therapy     Palliative care education provided to patient and/or family

## 2024-02-15 NOTE — PROGRESS NOTE ADULT - ASSESSMENT
89 year old male, poor historian, former smoker, with PMHx of CAD with RCA stents(years ago at [facility]), HTN, HLD, GERD, [place] who was BIBEMS for worsening non-radiating chest pain, s/p unwitnessed fall 2/9 AM. Pt was admitted to cardiac telemetry for NSTEMI. Pt was loaded with ASA/plavix, started on IV heparin drip, 2/10 AM pt noted to be more confused with dysarthria, L sided facial droop, L sided weakness UE>LE, +gurgling anterior chest /neck, sats 97% on 3 L NC. Patient not following commands, pointing towards R side ceiling. Neurology called and CT head obtained, showed subacute vs acute infarct, patient currently on comfort measures per hcp (alexandre cao).       # Acute CVA  - fall/aspiration/seizure precautions    - palliative care rec IV Dilaudid 0.2mg Q3h PRN for pain, dyspnea, tachypnea  - S+S consulted- unable to give recs for oral diet, but family would like for patient to have comfort feeds  - family meeting w nash Jones-> CMO: no labs, vitals, antibiotics; allow for comfort feeds      #NSTEMI  - As per nash pt has been complaining chest pain for long time  - Trop 1400-->1200-->1500-->128  - ECHO 2/12/24 EF 25% Mod-Severe MR, AR   - Pt not candidate for cardiac angiogram due to mental status and now CMO    #Pneumonia, possible aspiration PNA  - CXR Right basilar opacity  - was on IV Unasyn and Azithromycin, stopped for comfort measures  - labs stopped as patient is comfort measures   - consider nebs if needed     #HAYLEE  - Cr 1.8 on admission  - avoid nephrotoxins   - pt has warren now    #Thrombocytopenia   - chronic   - plts Oct 2023 110-->current 79    #GERD  - on protonix 40 mg IV     #HLD  - Atorvastatin 40mg qhs on hold d/t NPO status    - lipid profile LDL 50, Triglycerides 59    MISC  #DVT prophylaxis: SCDs  #GI prophylaxis: PPI  #Diet: Pureed  #Activity: Bedrest  #Code status: CMO; Nash Raya (HCP) [phone], GOC discussion was done, family made aware of patient's poor prognosis  #Disposition: Med/Surg. SW on board. Plan for long term nursing home with hospice (will need a private room at the NH).

## 2024-02-15 NOTE — PROGRESS NOTE ADULT - SUBJECTIVE AND OBJECTIVE BOX
24H events:    Patient is a 89y old Male who presents with a chief complaint of chest pain (14 Feb 2024 12:00)    Primary diagnosis of NSTEMI (non-ST elevation myocardial infarction)    Today is hospital day 6d. This morning patient was seen and examined at bedside, resting comfortably in bed.    No acute or major events overnight.      PAST MEDICAL & SURGICAL HISTORY  HTN (hypertension)    CAD (coronary artery disease)    BPH (benign prostatic hyperplasia)    HLD (hyperlipidemia)      SOCIAL HISTORY:  Social History:  retired city worker   lives with niece (09 Feb 2024 18:46)      ALLERGIES:  No Known Allergies    MEDICATIONS:  STANDING MEDICATIONS  chlorhexidine 2% Cloths 1 Application(s) Topical daily  pantoprazole  Injectable 40 milliGRAM(s) IV Push daily    PRN MEDICATIONS  HYDROmorphone  Injectable 0.2 milliGRAM(s) IV Push every 3 hours PRN    VITALS:   T(F): --  HR: --  BP: --  RR: --  SpO2: --    PHYSICAL EXAM:  GENERAL:   (X  ) NAD, lying in bed comfortably     (  ) obtunded     (  ) lethargic     (  ) somnolent    HEAD:   ( X ) Atraumatic     (  ) hematoma     (  ) laceration (specify location:       )     NECK:  ( X ) Supple     (  ) neck stiffness     (  ) nuchal rigidity     (  )  no JVD     (  ) JVD present ( -- cm)    HEART:  Rate -->     ( X ) normal rate     (  ) bradycardic     (  ) tachycardic  Rhythm -->     ( X ) regular     (  ) regularly irregular     (  ) irregularly irregular  Murmurs -->     (  ) normal s1s2     (  ) systolic murmur     (  ) diastolic murmur     (  ) continuous murmur      (  ) S3 present     (  ) S4 present    LUNGS: b/l crackles auscultated  (  )Unlabored respirations     (  ) tachypnea  (  ) B/L air entry     (  ) decreased breath sounds in:  (location     )    (  ) no adventitious sound     (  ) crackles     (  ) wheezing      (  ) rhonchi      (specify location:       )  (  ) chest wall tenderness (specify location:       )    ABDOMEN:   ( X ) Soft     (  ) tense   |   (  ) nondistended     (  ) distended   |   (  ) +BS     (  ) hypoactive bowel sounds     (  ) hyperactive bowel sounds  ( X ) nontender     (  ) RUQ tenderness     (  ) RLQ tenderness     (  ) LLQ tenderness     (  ) epigastric tenderness     (  ) diffuse tenderness  (  ) Splenomegaly      (  ) Hepatomegaly      (  ) Jaundice     (  ) ecchymosis     EXTREMITIES:  (X  ) Normal     (  ) Rash     (  ) ecchymosis     (  ) varicose veins      (  ) pitting edema     (  ) non-pitting edema   (  ) ulceration     (  ) gangrene:     (location:     )    NERVOUS SYSTEM:    (  ) A&Ox3     ( X ) confused     (  ) lethargic  CN II-XII:     (  ) Intact     (  ) deficits found     (Specify:     )   Upper extremities:     (  ) no sensorimotor deficits     (  ) weakness     (  ) loss of proprioception/vibration     (  ) loss of touch/temperature (specify:    )  Lower extremities:     (  ) no sensorimotor deficits     (  ) weakness     (  ) loss of proprioception/vibration     (  ) loss of touch/temperature (specify:    )    SKIN:   ( X ) No rashes or lesions     (  ) maculopapular rash     (  ) pustules     (  ) vesicles     (  ) ulcer     (  ) ecchymosis     (specify location:     )

## 2024-02-15 NOTE — PROGRESS NOTE ADULT - ASSESSMENT
89 year old male, poor historian, former smoker, with PMHx of CAD with RCA stents, HTN, HLD, GERD, [place] who was BIBEMS for worsening non-radiating chest pain. Patient found to have new stroke. Per chart, patient's family has decided on no invasive interventions and would like to focus on comfort.     2/13: Patient more alert and communicative. Reports that he is thirsty but denies pain. Able to tell me that Karen is his niece. Did not answer orientation questions.   Discussed with patient's niece/HCP, Karen. She states that patient would not enjoy his current quality of life. She does not want life-prolonging interventions as this would extend his suffering. She was agreeable to no labs, vitals, antibiotics. She was also agreeable to patient having small sips for comfort. DNR/DNI    2/15: Patient's mouth is very dry    Plan:  - continue IV dilaudid 0.2mg q3h PRN for pain/dyspnea/tachypnea    - if patient requires opioids on discharge, can start Roxanol solution 5mg q4h PRN for pain/dyspnea  - started on IV ativan 2mg q4h PRN    - recommend trying 0.5mg first and uptitrating dose as needed  - please assist with oral cares and 1:1 feeds for pleasure    - provider to RN order placed  - CMO: no labs, vitals, antibiotics; allow for comfort feeds  - DNR/DNI  - pending dispo    Discussed with primary team.     Palliative care will continue to follow.   Please call [phone] with questions or concerns 24/7.   _____________  Anil Jones MD  Palliative Medicine  [facility]   of Geriatric and Palliative Medicine  [phone]

## 2024-02-16 NOTE — HOSPICE CARE NOTE - CONVESATION DETAILS
Patient pending transfer to [facility]  Saturday. Hospice to admit to services on [place] morning.

## 2024-02-16 NOTE — PROGRESS NOTE ADULT - SUBJECTIVE AND OBJECTIVE BOX
HPI:  89 year old male, poor historian, former smoker, with PMHx of CAD with RCA stents, HTN, HLD, GERD, [place] who was BIBEMS for worsening non-radiating chest pain that started yesterday. Pt describes pain as sharp, midsternal, lasting for a few minutes at a time and self-resolves. Pt reports having intermittent chest pain for approximately 1 month but the past 2 days it has worsened, rating 8/10. He follows Dr. Martinez at [facility]. Pt currently chest pain free. Denies SOB, fevers/chills, N/V, Denies recent travel and recent illness.     In the ED: VS 98.3F, HR 75, /61, O2 sat 95% on room air. 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 Labs were significant for cr of 1.8 (baseline cr 1.0-1.1 in 10/2023), troponin 1409. CXR neg for cardiopulmonary disease. Pt received  mg x1, Plavix 300 mg x1, and was started on Heparin drip.     Of note, attempted to call Karen (donnell) multiple times for collateral information however, she did not respond.  (09 Feb 2024 18:46)    Interval history:  Patient progressively less coherent over the past 2 days. He was speaking rather intelligibly 2 days ago and I can no longer make sense of what he is trying to say.       ITEMS NOT CHECKED ARE NOT PRESENT        ADVANCE DIRECTIVES:    [ ] Full Code [x ] DNR  MOLST  [ ]  Living Will  [ ]   DECISION MAKER(s):  [x ] Health Care Proxy(s)  [ ] Surrogate(s)  [ ] Guardian           Name(s):   Karen (niece)    BASELINE (I)ADL(s) (prior to admission):  [place]: [ ]Total  [ ] Moderate [ ]Dependent  Palliative Performance Status Version 2:         %    http://npcrc.org/files/news/palliative_performance_scale_ppsv2.pdf    Allergies    No Known Allergies    Intolerances    MEDICATIONS  (STANDING):  chlorhexidine 2% Cloths 1 Application(s) Topical daily  pantoprazole    Tablet 40 milliGRAM(s) Oral before breakfast    MEDICATIONS  (PRN):  HYDROmorphone  Injectable 0.2 milliGRAM(s) IV Push every 3 hours PRN Moderate Pain (4 - 6)  LORazepam   Injectable 0.5 milliGRAM(s) IV Push every 4 hours PRN Agitation        PRESENT SYMPTOMS: [x ]Unable to obtain due to poor mentation   Source if other than patient:  [ ]Family   [ ]Team     Pain: [ ]yes [ ]no  QOL impact -   Location -                    Aggravating factors -  Alleviating factors -   Quality -  Radiation -  Timing -   Severity (0-10 scale):  Minimal acceptable level (0-10 scale):     CPOT:    https://www.[facility].org/getattachment/[email or web address]-[email or web address]/Critical-Care-Pain-Observation-Tool-(CPOT)    PAIN AD Score: 3  http://geriatrictoolkit.[facility]/cog/painad.pdf     Dyspnea:                           [ ]None[ ]Mild [ ]Moderate [ ]Severe     Respiratory Distress Observation Scale (RDOS): 2  A score of 0 to 2 signifies little or no respiratory distress, 3 signifies mild distress, scores 4 to 6 indicate moderate distress, and scores greater than 7 signify severe distress  https://www.[facility].ca/sites/default/files/PDFS/310041-cdmarkgzzff-requbbyo-cfrssisvpeh-jeqnp.pdf    Anxiety:                             [ ]None[ ]Mild [ ]Moderate [ ]Severe   Fatigue:                             [ ]None[ ]Mild [ ]Moderate [ ]Severe   Nausea:                             [ ]None[ ]Mild [ ]Moderate [ ]Severe   Loss of appetite:              [ ]None[ ]Mild [ ]Moderate [ ]Severe   Constipation:                    [ ]None[ ]Mild [ ]Moderate [ ]Severe    Other Symptoms:  [ ]All other review of systems negative     Palliative Performance Status Version 2:         %    http://npcrc.org/files/news/palliative_performance_scale_ppsv2.pdf  PHYSICAL EXAM:    GENERAL:  [ ] No acute distress [ ]Lethargic  [ ]Unarousable  [ ]Verbal  [x ]Non-Verbal [ ]Cachexia    BEHAVIORAL/PSYCH:  [ ]Alert and Oriented x  [ ] Anxiety [ ] Delirium [ ] Agitation [x ] Calm   EYES: [x ] No scleral icterus [ ] Scleral icterus [ ] Closed  ENMT:  [ ]Dry mouth  [x ]No external oral lesions [ ] No external ear or nose lesions  CARDIOVASCULAR:  [ ]Regular [ ]Irregular [ ]Tachy [ ]Not Tachy  [ ]Deni [ ] Edema [x ] No edema  PULMONARY:  [ ]Tachypnea  [ ]Audible excessive secretions [ ] No labored breathing [ x] mildly labored breathing [ ] labored breathing  GASTROINTESTINAL: [ ]Soft  [ ]Distended  [ x]Not distended [ ]Non tender [ ]Tender  MUSCULOSKELETAL: [ ]No clubbing [ ] clubbing  [x ] No cyanosis [ ] cyanosis  NEUROLOGIC: [ ]No focal deficits  [ ]Follows commands  [x ]Does not follow commands  [x ]Cognitive impairment  [ ]Dysphagia  [ ]Dysarthria  [ ]Paresis   SKIN: [ ] Jaundiced [ x] Non-jaundiced [ ]Rash [ ]No Rash [ ] Warm [ ] Dry  MISC/LINES: [ ] ET tube [ ] Trach [ ]NGT/OGT [ ]PEG [ ]Wei    CRITICAL CARE:  [ ] Shock Present  [ ]Septic [ ]Cardiogenic [ ]Neurologic [ ]Hypovolemic  [ ]  Vasopressors [ ]  Inotropes   [ ]Respiratory failure present [ ]Mechanical ventilation [ ]Non-invasive ventilatory support [ ]High flow  [ ]Acute  [ ]Chronic [ ]Hypoxic  [ ]Hypercarbic [ ]Other  [ ]Other organ failure     LABS:              CMO    RADIOLOGY & ADDITIONAL STUDIES:     < from: Xray Chest 1 View AP/PA (02.11.24 @ 06:24) >    INTERPRETATION:  Clinical History / Reason for exam: Shortness of Breath    Comparison : Chest radiograph: February 10 2024    Technique/Positioning: Frontal view of the chest    Findings:    Support devices: None.    Cardiac/mediastinum/hilum: No significant change    Skeleton/soft tissues: No significant change.    Lung parenchyma/Pleura: Stable bibasilar opacities. No definite   pneumothorax.    Impression:  Stable bibasilar opacities.            --- End of Report ---    < end of copied text >      PROTEIN CALORIE MALNUTRITION PRESENT: [ ]mild [ ]moderate [ ]severe [ ]underweight [ ]morbid obesity  https://www.andeal.org/vault/2440/web/files/ONC/Table_Clinical%20Characteristics%20to%20Document%20Malnutrition-White%20JV%20et%20al%202012.pdf    Height (cm): 167.6 (02-09-24 @ 20:49), 165.1 (11-10-23 @ 05:41), 165.1 (10-26-23 @ 20:34)  Weight (kg): 77.1 (02-09-24 @ 12:12), 59 (11-10-23 @ 05:41), 61.2 (10-26-23 @ 20:34)  BMI (kg/m2): 27.4 (02-09-24 @ 20:49), 28.3 (02-09-24 @ 12:12), 21.6 (11-10-23 @ 05:41)    [ ]PPSV2 < or = to 30% [ ]significant weight loss  [ ]poor nutritional intake  [ ]anasarca      [ ]Artificial Nutrition      Palliative Care Spiritual/Emotional Screening Tool Question  Severity (0-4):                    OR                    [ x] Unable to determine. Will assess at later time if appropriate.  Score of 2 or greater indicates recommendation of Chaplaincy and/or SW referral  Chaplaincy Referral: [ ] Yes [ ] Refused [ ] Following     Caregiver [place]:  [ ] Yes [ ] No    OR    [x ] Unable to determine. Will assess at later time if appropriate.  Social Work Referral [ ]  Patient and Family Centered Care Referral [ ]    Anticipatory Grief Present: [ ] Yes [ ] No    OR     [ x] Unable to determine. Will assess at later time if appropriate.  Social Work Referral [ ]  Patient and Family Centered Care Referral [ ]    REFERRALS:   [ ]Chaplaincy  [ ]Hospice  [ ]Child Life  [ ]Social Work  [ ]Case management [ ]Holistic Therapy     Palliative care education provided to patient and/or family

## 2024-02-16 NOTE — PROGRESS NOTE ADULT - ASSESSMENT
89 year old male, poor historian, former smoker, with PMHx of CAD with RCA stents, HTN, HLD, GERD, [place] who was BIBEMS for worsening non-radiating chest pain. Patient found to have new stroke. Per chart, patient's family has decided on no invasive interventions and would like to focus on comfort.     2/13: Patient more alert and communicative. Reports that he is thirsty but denies pain. Able to tell me that Karen is his niece. Did not answer orientation questions.   Discussed with patient's niece/HCP, Karen. She states that patient would not enjoy his current quality of life. She does not want life-prolonging interventions as this would extend his suffering. She was agreeable to no labs, vitals, antibiotics. She was also agreeable to patient having small sips for comfort. DNR/DNI    2/15: Patient's mouth is very dry    - please assist with oral cares and 1:1 feeds for pleasure    - provider to RN order placed  - CMO: no labs, vitals, antibiotics; allow for comfort feeds  - DNR/DNI  - pending dispo    Discussed with primary team.     Palliative care will continue to follow.   Please call [phone] with questions or concerns 24/7.

## 2024-02-16 NOTE — PROGRESS NOTE ADULT - SUBJECTIVE AND OBJECTIVE BOX
24H events:    Patient is a 89y old Male who presents with a chief complaint of chest pain (15 Feb 2024 16:14)    Primary diagnosis of NSTEMI (non-ST elevation myocardial infarction)    Today is hospital day 7d.     PAST MEDICAL & SURGICAL HISTORY  HTN (hypertension)    CAD (coronary artery disease)    BPH (benign prostatic hyperplasia)    HLD (hyperlipidemia)      SOCIAL HISTORY:  Social History:  retired city worker   lives with niece (09 Feb 2024 18:46)      ALLERGIES:  No Known Allergies    MEDICATIONS:  STANDING MEDICATIONS  chlorhexidine 2% Cloths 1 Application(s) Topical daily  pantoprazole    Tablet 40 milliGRAM(s) Oral before breakfast    PRN MEDICATIONS  HYDROmorphone  Injectable 0.2 milliGRAM(s) IV Push every 3 hours PRN  LORazepam   Injectable 0.5 milliGRAM(s) IV Push every 4 hours PRN    VITALS:   T(F): --  HR: --  BP: --  RR: --  SpO2: --    PHYSICAL EXAM:  GENERAL: NAD, non-toxic appearing  NECK: Supple, no stiffness, no JVD, no thyromegaly   HEART: Regular rate and rhythm, normal s1s2  LUNGS: Unlabored respirations, b/l air entry, no adventitous breath sounds   ABDOMEN: Soft, nontender, nondistended; +BS  EXTREMITIES: No clubbing, cyanosis, or edema; 2+ peripheral pulses   NERVOUS SYSTEM: AOx3  SKIN: Warm and dry, no rashes or lesions      AMPAC score:    (  ) Indwelling Wei Catheter:   Date insterted:    Reason (  ) Critical illness     (  ) urinary retention    (  ) Accurate Ins/Outs Monitoring     (  ) CMO patient    (  ) Central Line:   Date inserted:  Location: (  ) Right IJ     (  ) Left IJ     (  ) Right Fem     (  ) Left Fem    (  ) SPC        (  ) pigtail       (  ) PEG tube       (  ) colostomy       (  ) jejunostomy  (  ) U-Dall    LABS:                        RADIOLOGY:

## 2024-02-16 NOTE — PROGRESS NOTE ADULT - ATTENDING COMMENTS
Agree with assessment and plan above, unless otherwise documented in my attestation.    Mr Quarles is a 89yoM with PMhx of CAD s/p RCA PCI, HTN, HLD who is admitted for acute CVA. He was initially admitted to cardiac tele due to elevated troponin. Suspect that troponin is demand ischemia related to CVA vs delirium. Low suspicion for ACS at this time.   -Had consulted neuro and NSGY given acute CVA  -Continue statin and ASA  -TTE  -Have initiated GOC discussion with family. Patient is now DNR/DNI.  -Palliative care consult
Patient seen and examined independently. Agree with resident note with exceptions.    Pt on comfort care measures  Not in distress    # Acute CVA  # NSTEMI  # H/o CAD S/p stent  # Chronic systolic CHF with  Mod-Severe MR, AR   # Aspiration pneumonia  # Acute kidney injury  # Hypertension  # Chronic thrombocytopenia  # GERD  # Dyslipidemia    - DNR/DNI/ CMO  - no labs, vitals, antibiotics; allow for comfort feeds  - Hospice eval: SNF placement with private pay, hospice to remain available when d/c plan in place  - continue comfort care measures    - Time spent: 42 minutes. Direct patient care. Discussed with Housestaff. Reviewed chart . Discussed with CM
Medicine Attending Addendum  Patient was seen and examined with medicine team.  Nursing records reviewed. I agree with the resident/PA/NP's note including past medical history, home medications, social history, allergies, surgical history, family history, and review of system. I have reviewed relevant vitals, laboratory values, imaging studies, and microbiology.   - cmo patient; no acute complaints  - rest of A/P as per detailed housestaff note above except above modifications
Pt is a 89 yo M with PMHx of CAD, HTN, HLD, who presented with NSTEMI. Initial CT head with possible SDH later noted to be artifactual however repeat CT head showed acute/subacute right parietal ischemic stroke. Per chart review, pt/family pursuing CMO. please call should this status change and decide to pursue further stroke workup.

## 2024-02-16 NOTE — PROGRESS NOTE ADULT - PROBLEM SELECTOR PLAN 2
- As per niece pt has been complaining chest pain for long time  - Pt not candidate for cardiac angiogram due to mental status and now CMO

## 2024-02-16 NOTE — PROGRESS NOTE ADULT - ASSESSMENT
89 year old male, poor historian, former smoker, with PMHx of CAD with RCA stents(years ago at [facility]), HTN, HLD, GERD, [place] who was BIBEMS for worsening non-radiating chest pain, s/p unwitnessed fall 2/9 AM. Pt was admitted to cardiac telemetry for NSTEMI. Pt was loaded with ASA/plavix, started on IV heparin drip, 2/10 AM pt noted to be more confused with dysarthria, L sided facial droop, L sided weakness UE>LE, +gurgling anterior chest /neck, sats 97% on 3 L NC. Patient not following commands, pointing towards R side ceiling. Neurology called and CT head obtained, showed subacute vs acute infarct, patient currently on comfort measures per hcp (alexandre cao).     # Acute CVA  - fall/aspiration/seizure precautions    - palliative care rec IV Dilaudid 0.2mg Q3h PRN for pain, dyspnea, tachypnea  - S+S consulted- unable to give recs for oral diet, but family would like for patient to have comfort feeds  - family meeting w nash Jones-> CMO: no labs, vitals, antibiotics; allow for comfort feeds      #NSTEMI  - As per nash pt has been complaining chest pain for long time  - Trop 1400-->1200-->1500-->128  - ECHO 2/12/24 EF 25% Mod-Severe MR, AR   - Pt not candidate for cardiac angiogram due to mental status and now CMO    #Pneumonia, possible aspiration PNA  - CXR Right basilar opacity  - was on IV Unasyn and Azithromycin, stopped for comfort measures  - labs stopped as patient is comfort measures   - consider nebs if needed     #HAYLEE  - Cr 1.8 on admission  - avoid nephrotoxins   - pt has warren now    #Thrombocytopenia   - chronic   - plts Oct 2023 110-->current 79    #GERD  - on protonix 40 mg IV     #HLD  - Atorvastatin 40mg qhs on hold d/t NPO status    - lipid profile LDL 50, Triglycerides 59    MISC  #DVT prophylaxis: SCDs  #GI prophylaxis: PPI  #Diet: Pureed  #Activity: Bedrest  #Code status: CMO; Nash Raya (HCP) [phone], GOC discussion was done, family made aware of patient's poor prognosis  #Disposition: Med/Surg. SW on board. Plan for long term nursing home with hospice (will need a private room at the NH).

## 2024-02-16 NOTE — PROGRESS NOTE ADULT - PROBLEM SELECTOR PLAN 1
- S+S consulted- unable to give recs for oral diet, but family would like for patient to have comfort feeds  - family meeting w nash Jones-> CMO: no labs, vitals, antibiotics; allow for comfort feeds

## 2024-02-16 NOTE — PROGRESS NOTE ADULT - PROBLEM SELECTOR PLAN 3
- continue IV dilaudid 0.2mg q3h PRN for pain/dyspnea/tachypnea   - if patient requires opioids on discharge, can start Roxanol solution 5mg q4h PRN for pain/dyspnea  - c/w IV ativan 0.5mg q4h PRN, uptitrating dose as needed

## 2024-02-17 NOTE — PROGRESS NOTE ADULT - SUBJECTIVE AND OBJECTIVE BOX
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 (09 Feb 2024 18:46)      ALLERGIES:  No Known Allergies    MEDICATIONS:  STANDING MEDICATIONS  chlorhexidine 2% Cloths 1 Application(s) Topical daily  pantoprazole    Tablet 40 milliGRAM(s) Oral before breakfast    PRN MEDICATIONS  HYDROmorphone  Injectable 0.2 milliGRAM(s) IV Push every 3 hours PRN  LORazepam   Injectable 0.5 milliGRAM(s) IV Push every 4 hours PRN    VITALS:   T(F): --  HR: --  BP: --  RR: --  SpO2: --    PHYSICAL EXAM:  GENERAL: NAD, non-toxic appearing  NECK: Supple, no stiffness, no JVD, no thyromegaly   HEART: Regular rate and rhythm, normal s1s2  LUNGS: Unlabored respirations, b/l air entry, no adventitous breath sounds   ABDOMEN: Soft, nontender, nondistended; +BS  EXTREMITIES: No clubbing, cyanosis, or edema; 2+ peripheral pulses   NERVOUS SYSTEM: AOx3  SKIN: Warm and dry, no rashes or lesions      AMPAC score:    (  ) Indwelling Wei Catheter:   Date insterted:    Reason (  ) Critical illness     (  ) urinary retention    (  ) Accurate Ins/Outs Monitoring     (  ) CMO patient    (  ) Central Line:   Date inserted:  Location: (  ) Right IJ     (  ) Left IJ     (  ) Right Fem     (  ) Left Fem    (  ) SPC        (  ) pigtail       (  ) PEG tube       (  ) colostomy       (  ) jejunostomy  (  ) U-Dall    LABS:                        RADIOLOGY:

## 2024-02-17 NOTE — PROGRESS NOTE ADULT - PROVIDER SPECIALTY LIST ADULT
Cardiology
Internal Medicine
Internal Medicine
Palliative Care
Cardiology
Internal Medicine
NSICU
Neurology
Palliative Care

## 2024-02-17 NOTE — PROGRESS NOTE ADULT - ASSESSMENT
89 year old male, poor historian, former smoker, with PMHx of CAD with RCA stents(years ago at [facility]), HTN, HLD, GERD, [place] who was BIBEMS for worsening non-radiating chest pain, s/p unwitnessed fall 2/9 AM. Pt was admitted to cardiac telemetry for NSTEMI. Pt was loaded with ASA/plavix, started on IV heparin drip, 2/10 AM pt noted to be more confused with dysarthria, L sided facial droop, L sided weakness UE>LE, +gurgling anterior chest /neck, sats 97% on 3 L NC. Patient not following commands, pointing towards R side ceiling. Neurology called and CT head obtained, showed subacute vs acute infarct, patient currently on comfort measures per hcp (alexandre cao).     2/17/2024  (Late entry)  - RN reports no acute complaints  - started patient on scopolamine patch for hypersecretion    # Acute CVA  - fall/aspiration/seizure precautions    - palliative care rec IV Dilaudid 0.2mg Q3h PRN for pain, dyspnea, tachypnea  - S+S consulted- unable to give recs for oral diet, but family would like for patient to have comfort feeds  - family meeting w nash Jones-> CMO: no labs, vitals, antibiotics; allow for comfort feeds      #NSTEMI  - As per nash pt has been complaining chest pain for long time  - Trop 1400-->1200-->1500-->128  - ECHO 2/12/24 EF 25% Mod-Severe MR, AR   - Pt not candidate for cardiac angiogram due to mental status and now CMO    #Pneumonia, possible aspiration PNA  - CXR Right basilar opacity  - was on IV Unasyn and Azithromycin, stopped for comfort measures  - labs stopped as patient is comfort measures   - consider nebs if needed     #HAYLEE  - Cr 1.8 on admission  - avoid nephrotoxins   - pt has warren now    #Thrombocytopenia   - chronic   - plts Oct 2023 110-->current 79    #GERD  - on protonix 40 mg IV     #HLD  - Atorvastatin 40mg qhs on hold d/t NPO status    - lipid profile LDL 50, Triglycerides 59    MISC  #DVT prophylaxis: SCDs  #GI prophylaxis: PPI  #Diet: Pureed  #Activity: Bedrest  #Code status: CMO; Nash Raya (HCP) [phone], [facility] discussion was done, family made aware of patient's poor prognosis  #Disposition: Med/Surg. SW on board. Plan for long term nursing home with hospice (will need a private room at the NH).

## 2024-02-18 NOTE — DISCHARGE NOTE FOR THE EXPIRED PATIENT - HOSPITAL COURSE
89 year old male, poor historian, former smoker, with PMHx of CAD with RCA stents(years ago at [facility]), HTN, HLD, GERD, [place] who was BIBEMS for worsening non-radiating chest pain, s/p unwitnessed fall 2/9 AM. Pt was admitted to cardiac telemetry for NSTEMI. Pt was loaded with ASA/plavix, started on IV heparin drip, 2/10 AM pt noted to be more confused with dysarthria, L sided facial droop, L sided weakness UE>LE, +gurgling anterior chest /neck, sats 97% on 3 L NC. Patient not following commands, pointing towards R side ceiling. Neurology called and CT head obtained, showed subacute vs acute infarct, patient currently on comfort measures per hcp (alexandre cao).     # Acute CVA  - fall/aspiration/seizure precautions    - palliative care rec IV Dilaudid 0.2mg Q3h PRN for pain, dyspnea, tachypnea  - S+S consulted- unable to give recs for oral diet, but family would like for patient to have comfort feeds  - family meeting w nash Jones-> CMO: no labs, vitals, antibiotics; allow for comfort feeds      #NSTEMI  - As per nash pt has been complaining chest pain for long time  - Trop 1400-->1200-->1500-->128  - ECHO 2/12/24 EF 25% Mod-Severe MR, AR   - Pt not candidate for cardiac angiogram due to mental status and now CMO    #Pneumonia, possible aspiration PNA  - CXR Right basilar opacity  - was on IV Unasyn and Azithromycin, stopped for comfort measures  - labs stopped as patient is comfort measures   - consider nebs if needed     #HAYLEE  - Cr 1.8 on admission  - avoid nephrotoxins   - pt has warren now    #Thrombocytopenia   - chronic   - plts Oct 2023 110-->current 79    #GERD  - on protonix 40 mg IV     #HLD  - Atorvastatin 40mg qhs on hold d/t NPO status    - lipid profile LDL 50, Triglycerides 59    #Code status: CMO; Nash Raya (HCP) [phone], [facility] discussion was done, family made aware of patient's poor prognosis    The patient was made CMO, was pending discharge to [facility], Nurse called me around 12: 18 am, pt did not had spontaneous breathing, no carotid pulse, no corneal, gag reflex, pupil dilated and fixed,  no air entry in lungs. Pt was pronounced death at 12:20 AM, called Nash Raya ([facility]) [phone].

## 2024-02-23 DIAGNOSIS — Z87.891 PERSONAL HISTORY OF NICOTINE DEPENDENCE: ICD-10-CM

## 2024-02-23 DIAGNOSIS — E78.5 HYPERLIPIDEMIA, UNSPECIFIED: ICD-10-CM

## 2024-02-23 DIAGNOSIS — I63.9 CEREBRAL INFARCTION, UNSPECIFIED: ICD-10-CM

## 2024-02-23 DIAGNOSIS — I21.4 NON-ST ELEVATION (NSTEMI) MYOCARDIAL INFARCTION: ICD-10-CM

## 2024-02-23 DIAGNOSIS — G93.40 ENCEPHALOPATHY, UNSPECIFIED: ICD-10-CM

## 2024-02-23 DIAGNOSIS — Z66 DO NOT RESUSCITATE: ICD-10-CM

## 2024-02-23 DIAGNOSIS — I50.22 CHRONIC SYSTOLIC (CONGESTIVE) HEART FAILURE: ICD-10-CM

## 2024-02-23 DIAGNOSIS — R09.02 HYPOXEMIA: ICD-10-CM

## 2024-02-23 DIAGNOSIS — R29.810 FACIAL WEAKNESS: ICD-10-CM

## 2024-02-23 DIAGNOSIS — D69.6 THROMBOCYTOPENIA, UNSPECIFIED: ICD-10-CM

## 2024-02-23 DIAGNOSIS — R47.1 DYSARTHRIA AND ANARTHRIA: ICD-10-CM

## 2024-02-23 DIAGNOSIS — J69.0 PNEUMONITIS DUE TO INHALATION OF FOOD AND VOMIT: ICD-10-CM

## 2024-02-23 DIAGNOSIS — I44.0 ATRIOVENTRICULAR BLOCK, FIRST DEGREE: ICD-10-CM

## 2024-02-23 DIAGNOSIS — I11.0 HYPERTENSIVE HEART DISEASE WITH HEART FAILURE: ICD-10-CM

## 2024-02-23 DIAGNOSIS — K21.9 GASTRO-ESOPHAGEAL REFLUX DISEASE WITHOUT ESOPHAGITIS: ICD-10-CM

## 2024-02-23 DIAGNOSIS — Z95.5 PRESENCE OF CORONARY ANGIOPLASTY IMPLANT AND GRAFT: ICD-10-CM

## 2024-02-23 DIAGNOSIS — Z51.5 ENCOUNTER FOR PALLIATIVE CARE: ICD-10-CM

## 2024-02-23 DIAGNOSIS — Z79.82 LONG TERM (CURRENT) USE OF ASPIRIN: ICD-10-CM

## 2024-02-23 DIAGNOSIS — I25.10 ATHEROSCLEROTIC HEART DISEASE OF NATIVE CORONARY ARTERY WITHOUT ANGINA PECTORIS: ICD-10-CM

## 2024-02-23 DIAGNOSIS — G81.94 HEMIPLEGIA, UNSPECIFIED AFFECTING LEFT NONDOMINANT SIDE: ICD-10-CM

## 2024-02-23 DIAGNOSIS — F03.A0 UNSPECIFIED DEMENTIA, MILD, WITHOUT BEHAVIORAL DISTURBANCE, PSYCHOTIC DISTURBANCE, MOOD DISTURBANCE, AND ANXIETY: ICD-10-CM

## 2024-02-23 DIAGNOSIS — R29.719 NIHSS SCORE 19: ICD-10-CM

## 2024-02-23 DIAGNOSIS — G93.6 CEREBRAL EDEMA: ICD-10-CM

## 2024-02-23 DIAGNOSIS — I08.0 RHEUMATIC DISORDERS OF BOTH MITRAL AND AORTIC VALVES: ICD-10-CM

## 2024-02-23 DIAGNOSIS — N40.0 BENIGN PROSTATIC HYPERPLASIA WITHOUT LOWER URINARY TRACT SYMPTOMS: ICD-10-CM

## 2024-02-23 DIAGNOSIS — N17.9 ACUTE KIDNEY FAILURE, UNSPECIFIED: ICD-10-CM

## 2024-04-04 ENCOUNTER — NON-APPOINTMENT (OUTPATIENT)
Age: 89
End: 2024-04-04

## 2025-07-06 NOTE — ED ADULT NURSE NOTE - CHIEF COMPLAINT
bilateral upper extremity Active ROM was WFL (within functional limits)/bilateral  lower extremity Active ROM was WFL (within functional limits) The patient is a 89y Male complaining of altered mental status.